# Patient Record
Sex: MALE | Race: WHITE | NOT HISPANIC OR LATINO | Employment: OTHER | URBAN - METROPOLITAN AREA
[De-identification: names, ages, dates, MRNs, and addresses within clinical notes are randomized per-mention and may not be internally consistent; named-entity substitution may affect disease eponyms.]

---

## 2017-10-19 ENCOUNTER — OFFICE VISIT (OUTPATIENT)
Dept: URGENT CARE | Facility: URGENT CARE | Age: 27
End: 2017-10-19
Payer: COMMERCIAL

## 2017-10-19 VITALS
OXYGEN SATURATION: 98 % | SYSTOLIC BLOOD PRESSURE: 149 MMHG | WEIGHT: 200.1 LBS | DIASTOLIC BLOOD PRESSURE: 88 MMHG | TEMPERATURE: 98.5 F | BODY MASS INDEX: 28.71 KG/M2 | HEART RATE: 73 BPM

## 2017-10-19 DIAGNOSIS — R04.0 EPISTAXIS: Primary | ICD-10-CM

## 2017-10-19 PROCEDURE — 99203 OFFICE O/P NEW LOW 30 MIN: CPT | Performed by: FAMILY MEDICINE

## 2017-10-19 NOTE — MR AVS SNAPSHOT
After Visit Summary   10/19/2017    Jian Oliveira    MRN: [id number]           Patient Information     Date Of Birth          1990        Visit Information        Provider Department      10/19/2017 8:50 PM Liot Whitten MD [facility]        Today's Diagnoses     Epistaxis    -  1      Care Instructions    Based on your medical history and these are the current health maintenance or preventive care services that you are due for (some may have been done at this visit)  Health Maintenance Due   Topic Date Due     TETANUS IMMUNIZATION (SYSTEM ASSIGNED)  08/24/2007     INFLUENZA VACCINE (SYSTEM ASSIGNED)  09/01/2017         At [facility], we strive to deliver an exceptional experience to you, every time we see you.    If you receive a survey in the mail, please send us back your thoughts. We really do value your feedback.    Your care team's suggested websites for health information:  Www.Washio : Up to date and easily searchable information on multiple topics.  Www.medlineplus.gov : medication info, interactive tutorials, watch real surgeries online  Www.familydoctor.org : good info from the Academy of Family Physicians  Www.cdc.gov : public health info, travel advisories, epidemics (H1N1)  Www.aap.org : children's health info, normal development, vaccinations  Www.health.[facility].[place].us : [place] dept of health, public health issues in [place], N1N1    How to contact your care team:   Team Sallie/Maximiliano [phone]         Pharmacy [phone]    Dr. Garay, Olya Subramanian PA-C, Kathrine Mckoy CNP, Emilie Ceballos PA-C, Dr. Reid, and ELVIN Dang CNP    Team RN: Karlie      Clinic hours  M-Th 7 am-7 pm   Fri 7 am-5 pm.   Urgent care M-F 11 am-9 pm,   Sat/Sun 9 am-5 pm.  Pharmacy M-Th 8 am-8 pm Fri 8 am-6 pm  Sat/Sun 9 am-5 pm.     All password changes, disabled accounts, or ID changes in MyChart/MyHealth will be done  by our Access Services Department.    If you need help with your account or password, call: [phone]. Clinic staff no longer has the ability to change passwords.     Nosebleed (Adult)    Bleeding from the nose most commonly occurs because of injury or drying and cracking of the inner lining of the nose. Most nosebleeds are because of dry air or nose-picking. They can occur during a common cold or an allergy attack. They can also occur on a very hot day, or from dry air in the winter.  If the bleeding site is found, it may be cauterized. This means it is treated to cause a blood clot to form. This may be done with a chemical, heat, or electricity. If the bleeding continues after the site is cauterized, or if the site cannot be found, packing may be put in your nose. This is to apply pressure and stop the bleeding. The packing may be made of gauze or sponge. A small balloon catheter is sometimes used. These must be removed by your doctor. Some types of packing dissolve on their own.  Home care    If packing was put in your nose, unless told otherwise, do not pull on it or try to remove it yourself. You will be given an appointment to have it removed. You may also have been given antibiotics to prevent a sinus infection. If so, finish all of the medicine.    Do not blow your nose for 12 hours after the bleeding stops. This will allow a strong blood clot to form. Do not pick your nose. This may restart bleeding.    Avoid drinking alcohol and hot liquids for the next 2 days. Alcohol or hot liquids in your mouth can dilate blood vessels in your nose. This can cause bleeding to start again.    Do not take ibuprofen, naproxen, or medicines that contain aspirin. These thin the blood and may cause your nose to bleed. You may take acetaminophen for pain, unless another pain medicine was prescribed.    If the bleeding starts again, sit up and lean forward to prevent swallowing blood. Pinch your nose tightly on both sides,  as shown above, for 10 to 15 minutes. Time yourself. Don t release the pressure on your nose until 10 minutes is up. If bleeding does not stop, continue to pinch your nose and call your healthcare provider or return to this facility.    If you have a cold, allergies, or dry nasal membranes, lubricate the nasal passages. Apply a small amount of petroleum jelly inside the nose with a cotton swab twice a day (morning and night).    Avoid overheating your home. This can dry the air and make your condition worse.    Put a humidifier in the room where you sleep. This will add moisture to the air.    Use a saline nasal spray to keep nasal passages moist.    Do not pick your nose. Keep fingernails trimmed to decrease risk of bleeds.    Do not smoke.  Follow-up care  Follow up with your healthcare provider, or as advised. Nasal packing should be rechecked or removed within 2 to 3 days.  When to seek medical advice  Call your healthcare provider right away if any of these occur.    You have another nosebleed that you cannot control    Dizziness, weakness, or fainting    You become tired or confused    Fever of 100.4 F (38 C) or higher, or as directed by your healthcare provider    Headache    Sinus or facial pain    Shortness of breath or trouble breathing  Date Last Reviewed: 3/22/2015    8714-0399 [employer]. [address] [place] [address]. All rights reserved. This information is not intended as a substitute for professional medical care. Always follow your healthcare professional's instructions.                Follow-ups after your visit        Additional Services     OTOLARYNGOLOGY REFERRAL       Your provider has referred you to: SPIKE: [facility] [phone]   [email or web address]    Please be aware that coverage of these services is subject to the terms and limitations of your health insurance plan.  Call member services at your  "health plan with any benefit or coverage questions.      Please bring the following with you to your appointment:    (1) Any X-Rays, CTs or MRIs which have been performed.  Contact the facility where they were done to arrange for  prior to your scheduled appointment.   (2) List of current medications  (3) This referral request   (4) Any documents/labs given to you for this referral                  Who to contact     If you have questions or need follow up information about today's clinic visit or your schedule please contact [facility] directly at [phone].  Normal or non-critical lab and imaging results will be communicated to you by ApoVaxhart, letter or phone within 4 business days after the clinic has received the results. If you do not hear from us within 7 days, please contact the clinic through ApoVaxhart or phone. If you have a critical or abnormal lab result, we will notify you by phone as soon as possible.  Submit refill requests through One Touch EMR or call your pharmacy and they will forward the refill request to us. Please allow 3 business days for your refill to be completed.          Additional Information About Your Visit        MyChart Information     One Touch EMR lets you send messages to your doctor, view your test results, renew your prescriptions, schedule appointments and more. To sign up, go to [email or web address] . Click on \"Log in\" on the left side of the screen, which will take you to the Welcome page. Then click on \"Sign up Now\" on the right side of the page.     You will be asked to enter the access code listed below, as well as some personal information. Please follow the directions to create your username and password.     Your access code is: [email or web address]  Expires: 2018  9:13 PM     Your access code will  in 90 days. If you need help or a new code, please call your [facility] or [phone].        Care EveryWhere ID     This is your Care EveryWhere " ID. This could be used by other organizations to access your [place] medical records  [id number]        Your Vitals Were     Pulse Temperature Pulse Oximetry BMI (Body Mass Index)          73 98.5  F (36.9  C) (Oral) 98% 28.71 kg/m2         Blood Pressure from Last 3 Encounters:   10/19/17 149/88   11/20/13 138/76    Weight from Last 3 Encounters:   10/19/17 200 lb 1.6 oz (90.8 kg)   11/20/13 175 lb (79.4 kg)              We Performed the Following     OTOLARYNGOLOGY REFERRAL        Primary Care Provider Office Phone # Fax #    Alphonso Sims [phone]       [id number] [place]  [facility] [address]        Equal Access to Services     FLAVIA FRAZIER : Hadii aad ku hadasho Soomaali, waaxda luqadaha, qaybta kaalmada adeegyada, waxay idiin hayaan adriel kheverette cotton . So [facility] [phone].    ATENCIÓN: Si habla español, tiene a villasenor disposición servicios gratuitos de asistencia lingüística. Llame al [phone].    We comply with applicable federal civil rights laws and [place] laws. We do not discriminate on the basis of race, color, national origin, age, disability, sex, sexual orientation, or gender identity.            Thank you!     Thank you for choosing [facility]  for your care. Our goal is always to provide you with excellent care. Hearing back from our patients is one way we can continue to improve our services. Please take a few minutes to complete the written survey that you may receive in the mail after your visit with us. Thank you!             Your Updated Medication List - Protect others around you: Learn how to safely use, store and throw away your medicines at www.disposemymeds.org.      Notice  As of 10/19/2017  9:13 PM    You have not been prescribed any medications.

## 2017-10-20 NOTE — PATIENT INSTRUCTIONS
Based on your medical history and these are the current health maintenance or preventive care services that you are due for (some may have been done at this visit)  Health Maintenance Due   Topic Date Due     TETANUS IMMUNIZATION (SYSTEM ASSIGNED)  08/24/2007     INFLUENZA VACCINE (SYSTEM ASSIGNED)  09/01/2017         At [facility], we strive to deliver an exceptional experience to you, every time we see you.    If you receive a survey in the mail, please send us back your thoughts. We really do value your feedback.    Your care team's suggested websites for health information:  Www.[facility].org : Up to date and easily searchable information on multiple topics.  Www.medlineplus.gov : medication info, interactive tutorials, watch real surgeries online  Www.familydoctor.org : good info from the Academy of Family Physicians  Www.cdc.gov : public health info, travel advisories, epidemics (H1N1)  Www.aap.org : children's health info, normal development, vaccinations  Www.health.[facility].[place].us : [place] dept of health, public health issues in [place], N1N1    How to contact your care team:   Stephani Rizo/Maximiliano [phone]         Pharmacy [phone]    Dr. Garay, Olya Subramanian PA-C, Dr. Patel, Kathrine OCONNOR CNP, Emilie Ceballos PA-C, Dr. Reid, and ELVIN Dang CNP    Team RN: Karlie      Clinic hours  M-Th 7 am-7 pm   Fri 7 am-5 pm.   Urgent care M-F 11 am-9 pm,   Sat/Sun 9 am-5 pm.  Pharmacy M-Th 8 am-8 pm Fri 8 am-6 pm  Sat/Sun 9 am-5 pm.     All password changes, disabled accounts, or ID changes in [email or web address] will be done by our Access Services Department.    If you need help with your account or password, call: [phone]. Clinic staff no longer has the ability to change passwords.     Nosebleed (Adult)    Bleeding from the nose most commonly occurs because of injury or drying and cracking of the inner lining of the nose. Most nosebleeds are because of dry air or  nose-picking. They can occur during a common cold or an allergy attack. They can also occur on a very hot day, or from dry air in the winter.  If the bleeding site is found, it may be cauterized. This means it is treated to cause a blood clot to form. This may be done with a chemical, heat, or electricity. If the bleeding continues after the site is cauterized, or if the site cannot be found, packing may be put in your nose. This is to apply pressure and stop the bleeding. The packing may be made of gauze or sponge. A small balloon catheter is sometimes used. These must be removed by your doctor. Some types of packing dissolve on their own.  Home care    If packing was put in your nose, unless told otherwise, do not pull on it or try to remove it yourself. You will be given an appointment to have it removed. You may also have been given antibiotics to prevent a sinus infection. If so, finish all of the medicine.    Do not blow your nose for 12 hours after the bleeding stops. This will allow a strong blood clot to form. Do not pick your nose. This may restart bleeding.    Avoid drinking alcohol and hot liquids for the next 2 days. Alcohol or hot liquids in your mouth can dilate blood vessels in your nose. This can cause bleeding to start again.    Do not take ibuprofen, naproxen, or medicines that contain aspirin. These thin the blood and may cause your nose to bleed. You may take acetaminophen for pain, unless another pain medicine was prescribed.    If the bleeding starts again, sit up and lean forward to prevent swallowing blood. Pinch your nose tightly on both sides, as shown above, for 10 to 15 minutes. Time yourself. Don t release the pressure on your nose until 10 minutes is up. If bleeding does not stop, continue to pinch your nose and call your healthcare provider or return to this facility.    If you have a cold, allergies, or dry nasal membranes, lubricate the nasal passages. Apply a small amount of  petroleum jelly inside the nose with a cotton swab twice a day (morning and night).    Avoid overheating your home. This can dry the air and make your condition worse.    Put a humidifier in the room where you sleep. This will add moisture to the air.    Use a saline nasal spray to keep nasal passages moist.    Do not pick your nose. Keep fingernails trimmed to decrease risk of bleeds.    Do not smoke.  Follow-up care  Follow up with your healthcare provider, or as advised. Nasal packing should be rechecked or removed within 2 to 3 days.  When to seek medical advice  Call your healthcare provider right away if any of these occur.    You have another nosebleed that you cannot control    Dizziness, weakness, or fainting    You become tired or confused    Fever of 100.4 F (38 C) or higher, or as directed by your healthcare provider    Headache    Sinus or facial pain    Shortness of breath or trouble breathing  Date Last Reviewed: 3/22/2015    4944-1606 [employer]. [address] [place] [address], [place] [address]. All rights reserved. This information is not intended as a substitute for professional medical care. Always follow your healthcare professional's instructions.

## 2017-10-20 NOTE — PROGRESS NOTES
SUBJECTIVE:   Jian Oliveira is a 27 year old male who presents to clinic today for the following health issues:        Headaches      Duration: yesterday    Description  Location: unilateral but varies as to which side   Character: throbbing pain  Frequency:  Comes and goes  Duration:  20 mins    Intensity:  mild    Accompanying signs and symptoms:    Precipitating or Alleviating factors:  Nausea/vomiting: no  Dizziness: very light  Weakness or numbness: no  Visual changes: cloudy vision  Fever: no   Sinus or URI symptoms YES    History  Head trauma: no   Family history of migraines: no   Previous tests for headaches: no   Neurologist evaluations: no   Able to do daily activities when headache present: YES  Wake with headaches: no  Daily pain medication use: no  Any 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  Additional history: as documented    There is no problem list on file for this patient.    No past surgical history on file.    Social History   Substance Use Topics     Smoking status: Never Smoker     Smokeless tobacco: Not on file     Alcohol use Not on file     No family history on file.      No current outpatient prescriptions on file.     No Known Allergies  Recent Labs   Lab Test  11/20/13   1459   CR  0.85   GFRESTIMATED  >90   GFRESTBLACK  >90   POTASSIUM  4.1      BP Readings from Last 3 Encounters:   10/19/17 149/88   11/20/13 138/76    Wt Readings from Last 3 Encounters:   10/19/17 200 lb 1.6 oz (90.8 kg)   11/20/13 175 lb (79.4 kg)                  Labs reviewed in EPIC          Reviewed and updated as needed this visit by clinical staff       Reviewed and updated as needed this visit by  Provider         ROS:  Constitutional, HEENT, cardiovascular, pulmonary, gi and gu systems are negative, except as otherwise noted.      OBJECTIVE:   /88 (BP Location: Right arm, Patient Position: Sitting, Cuff Size: Adult Regular)  Pulse 73  Temp 98.5  F (36.9  C) (Oral)  Wt 200 lb 1.6 oz (90.8 kg)  SpO2 98%  BMI 28.71 kg/m2  Body mass index is 28.71 kg/(m^2).  GENERAL: healthy, alert and no distress  EYES: Eyes grossly normal to inspection, PERRL and conjunctivae and sclerae normal  HENT: normal cephalic/atraumatic, ear canals and TM's normal, oral mucous membranes moist and some dry blood involving right nostril, no active bleeder point/polyps noted  NECK: no adenopathy, no asymmetry, masses, or scars and thyroid normal to palpation  RESP: lungs clear to auscultation - no rales, rhonchi or wheezes  CV: regular rates and rhythm, normal S1 S2, no S3 or S4 and no murmur, click or rub  ABDOMEN: soft, nontender, no hepatosplenomegaly, no masses and bowel sounds normal  MS: no gross musculoskeletal defects noted, no edema  NEURO: Normal strength and tone, mentation intact and speech normal    ASSESSMENT/PLAN:       1. Epistaxis  - Patient experiencing recurrent epistaxis, has experienced epistaxis before, siblings also do have epistaxis history  - Recommended to avoid NSAIDs and blowing nose once bleeding stops  - Suggested to use humidifier and nasal saline for avoiding dryness  - Home care discussed in detail and ENT referral placed for further review and recommendations  - OTOLARYNGOLOGY REFERRAL    Patient Instructions     Based on your medical history and these are the current health maintenance or preventive care services that you are due for (some may have been done at this visit)  Health Maintenance Due   Topic Date Due     TETANUS IMMUNIZATION (SYSTEM ASSIGNED)  08/24/2007     INFLUENZA VACCINE (SYSTEM ASSIGNED)  09/01/2017         At [facility], we strive to deliver an  exceptional experience to you, every time we see you.    If you receive a survey in the mail, please send us back your thoughts. We really do value your feedback.    Your care team's suggested websites for health information:  Www.[facility].org : Up to date and easily searchable information on multiple topics.  Www.medlineplus.gov : medication info, interactive tutorials, watch real surgeries online  Www.familydoctor.org : good info from the Academy of Family Physicians  Www.cdc.gov : public health info, travel advisories, epidemics (H1N1)  Www.aap.org : children's health info, normal development, vaccinations  Www.health.[facility].[place].us : [place] dept of health, public health issues in [place], N1N1    How to contact your care team:   Team Sallie/Spirit [phone]         Pharmacy [phone]    Dr. Garay, Olya Subramanian PA-C, Dr. Patel, Kathrine OCONNOR CNP, Emilie Ceballos PA-C, Dr. Reid, and ELVIN Dang CNP    Team RN: Karlie      Clinic hours  M-Th 7 am-7 pm   Fri 7 am-5 pm.   Urgent care M-F 11 am-9 pm,   Sat/Sun 9 am-5 pm.  Pharmacy M-Th 8 am-8 pm Fri 8 am-6 pm  Sat/Sun 9 am-5 pm.     All password changes, disabled accounts, or ID changes in Leosphere/MyHealth will be done by our Access Services Department.    If you need help with your account or password, call: [phone]. Clinic staff no longer has the ability to change passwords.     Nosebleed (Adult)    Bleeding from the nose most commonly occurs because of injury or drying and cracking of the inner lining of the nose. Most nosebleeds are because of dry air or nose-picking. They can occur during a common cold or an allergy attack. They can also occur on a very hot day, or from dry air in the winter.  If the bleeding site is found, it may be cauterized. This means it is treated to cause a blood clot to form. This may be done with a chemical, heat, or electricity. If the bleeding continues after the site is cauterized, or if the site cannot  be found, packing may be put in your nose. This is to apply pressure and stop the bleeding. The packing may be made of gauze or sponge. A small balloon catheter is sometimes used. These must be removed by your doctor. Some types of packing dissolve on their own.  Home care    If packing was put in your nose, unless told otherwise, do not pull on it or try to remove it yourself. You will be given an appointment to have it removed. You may also have been given antibiotics to prevent a sinus infection. If so, finish all of the medicine.    Do not blow your nose for 12 hours after the bleeding stops. This will allow a strong blood clot to form. Do not pick your nose. This may restart bleeding.    Avoid drinking alcohol and hot liquids for the next 2 days. Alcohol or hot liquids in your mouth can dilate blood vessels in your nose. This can cause bleeding to start again.    Do not take ibuprofen, naproxen, or medicines that contain aspirin. These thin the blood and may cause your nose to bleed. You may take acetaminophen for pain, unless another pain medicine was prescribed.    If the bleeding starts again, sit up and lean forward to prevent swallowing blood. Pinch your nose tightly on both sides, as shown above, for 10 to 15 minutes. Time yourself. Don t release the pressure on your nose until 10 minutes is up. If bleeding does not stop, continue to pinch your nose and call your healthcare provider or return to this facility.    If you have a cold, allergies, or dry nasal membranes, lubricate the nasal passages. Apply a small amount of petroleum jelly inside the nose with a cotton swab twice a day (morning and night).    Avoid overheating your home. This can dry the air and make your condition worse.    Put a humidifier in the room where you sleep. This will add moisture to the air.    Use a saline nasal spray to keep nasal passages moist.    Do not pick your nose. Keep fingernails trimmed to decrease risk of  bleeds.    Do not smoke.  Follow-up care  Follow up with your healthcare provider, or as advised. Nasal packing should be rechecked or removed within 2 to 3 days.  When to seek medical advice  Call your healthcare provider right away if any of these occur.    You have another nosebleed that you cannot control    Dizziness, weakness, or fainting    You become tired or confused    Fever of 100.4 F (38 C) or higher, or as directed by your healthcare provider    Headache    Sinus or facial pain    Shortness of breath or trouble breathing  Date Last Reviewed: 3/22/2015    2689-2760 [employer] [address] [place] [address]. All rights reserved. This information is not intended as a substitute for professional medical care. Always follow your healthcare professional's instructions.            Lito Whitten MD  [facility]

## 2017-10-20 NOTE — NURSING NOTE
"Chief Complaint   Patient presents with     Epistaxis     Headache       Initial /88 (BP Location: Right arm, Patient Position: Sitting, Cuff Size: Adult Regular)  Pulse 73  Temp 98.5  F (36.9  C) (Oral)  Wt 200 lb 1.6 oz (90.8 kg)  SpO2 98%  BMI 28.71 kg/m2 Estimated body mass index is 28.71 kg/(m^2) as calculated from the following:    Height as of 11/20/13: 5' 10\" (1.778 m).    Weight as of this encounter: 200 lb 1.6 oz (90.8 kg).  Medication Reconciliation: complete   Harish GARCIA      "

## 2017-10-25 ENCOUNTER — OFFICE VISIT (OUTPATIENT)
Dept: OTOLARYNGOLOGY | Facility: CLINIC | Age: 27
End: 2017-10-25
Payer: COMMERCIAL

## 2017-10-25 VITALS — TEMPERATURE: 97 F | WEIGHT: 200.8 LBS | BODY MASS INDEX: 28.75 KG/M2 | HEIGHT: 70 IN

## 2017-10-25 DIAGNOSIS — R04.0 EPISTAXIS: Primary | ICD-10-CM

## 2017-10-25 PROCEDURE — 99203 OFFICE O/P NEW LOW 30 MIN: CPT | Performed by: OTOLARYNGOLOGY

## 2017-10-25 NOTE — MR AVS SNAPSHOT
After Visit Summary   10/25/2017    Jian Oliveira    MRN: [id number]           Patient Information     Date Of Birth          1990        Visit Information        Provider Department      10/25/2017 2:45 PM Mark Savage MD [facility]        Today's Diagnoses     Epistaxis    -  1      Care Instructions    General Scheduling Information  To schedule your CT/MRI scan, please contact [place] [facility] at [phone] [address] [place] [address]    To schedule your Surgery, please contact our Specialty Schedulers at [phone]    ENT Clinic Locations Clinic Hours Telephone Number     [facility]  [address]  [place] [address]   Tuesday:       8:00am -- 4:00pm    Wednesday:  8:00am - 4:00pm   To schedule an appointment with   Dr. Savage,   please contact our   Specialty Scheduling Department at:     [phone]       [facility]  [address] [place] [address]   Friday:          8:00am - 4:00pm         Urgent Care Locations Clinic Hours Telephone Numbers     [facility] [place]  [address] [address]     Monday-Friday:     11:00pm - 9:00pm    Saturday-Sunday:  9:00am - 5:00pm   [phone]     [facility] [place]  [address]   [place] [address]     Monday-Friday:      5:00pm - 9:00pm     Saturday-Sunday:  9:00am - 5:00pm   [phone]                 Follow-ups after your visit        Who to contact     If you have questions or need follow up information about today's clinic visit or your schedule please contact [facility] directly at [phone].  Normal or non-critical lab and imaging results will be communicated to you by MyChart, letter or phone within 4 business days after the clinic has received the results. If you do not hear from us within 7 days, please contact the clinic through MyChart or phone. If you have a critical or abnormal lab result, we will notify you by phone  "as soon as possible.  Submit refill requests through Find That File or call your pharmacy and they will forward the refill request to us. Please allow 3 business days for your refill to be completed.          Additional Information About Your Visit        StypiharRingCaptcha Information     Find That File lets you send messages to your doctor, view your test results, renew your prescriptions, schedule appointments and more. To sign up, go to [email or web address] license [email or web address] [email or web address] That [email or web address] . Click on \"Log in\" on the left side of the screen, which will take you to the Welcome page. Then click on \"Sign up Now\" on the right side of the page.     You will be asked to enter the access code listed below, as well as some personal information. Please follow the directions to create your username and password.     Your access code is: [email or web address]  Expires: 2018  9:13 PM     Your access code will  in 90 days. If you need help or a new code, please call your [facility] clinic or [phone].        Care EveryWhere ID     This is your Care EveryWhere ID. This could be used by other organizations to access your [facility] medical records  [id number]        Your Vitals Were     Temperature Height BMI (Body Mass Index)             97  F (36.1  C) (Tympanic) 1.778 m (5' 10\") 28.81 kg/m2          Blood Pressure from Last 3 Encounters:   10/19/17 149/88   13 138/76    Weight from Last 3 Encounters:   10/25/17 91.1 kg (200 lb 12.8 oz)   10/19/17 90.8 kg (200 lb 1.6 oz)   13 79.4 kg (175 lb)              Today, you had the following     No orders found for display       Primary Care Provider Office Phone # Fax #    Alphonso Sims [phone]       [address]  [place] [address]        Equal Access to Services     [facility]: Linda Moffett, waaxda luqadaha, qaybta kaalmaelizabeth bush, elieser cotton . [facility] [phone].    ATENCIÓN: Si ubaldo bahena, tiene a villasenor disposición servicios " haley de asistencia lingüística. Leyda winter [phone].    We comply with applicable federal civil rights laws and [place] laws. We do not discriminate on the basis of race, color, national origin, age, disability, sex, sexual orientation, or gender identity.            Thank you!     Thank you for choosing [facility]  for your care. Our goal is always to provide you with excellent care. Hearing back from our patients is one way we can continue to improve our services. Please take a few minutes to complete the written survey that you may receive in the mail after your visit with us. Thank you!             Your Updated Medication List - Protect others around you: Learn how to safely use, store and throw away your medicines at www.disposemymeds.org.      Notice  As of 10/25/2017  3:42 PM    You have not been prescribed any medications.

## 2017-10-25 NOTE — PROGRESS NOTES
"Chief Complaint - Epistaxis    History of Present Illness - Jian Mcallisterkym Oliveira is a 27 year old male presents with approximately a week of epistaxis. It occurs on the right side. It usually only comes out the front of the nose, but it has ran down the back of the throat at times. The bleeding occurs approximately a few times over a few days, but none in last 3-4 days. The patient can get the bleeding to stop by pressure. The patient has no personal or family history of bleeding disorders. The patient takes no blood-thinning medication. The patient has tried nothing.     Past Medical History - anxiety    Allergies - cephalosporins    Social History -   Social History     Social History     Marital status: Single     Spouse name: N/A     Number of children: N/A     Years of education: N/A     Social History Main Topics     Smoking status: Never Smoker     Smokeless tobacco: None     Alcohol use None     Drug use: None     Sexual activity: Not Asked     Other Topics Concern     None     Social History Narrative   nonsmoker    Family History - see HPI    Review of Systems - As per HPI and PMHx, otherwise 7 system review of the head and neck negative.    Physical Exam  Temp 97  F (36.1  C) (Tympanic)  Ht 1.778 m (5' 10\")  Wt 91.1 kg (200 lb 12.8 oz)  BMI 28.81 kg/m2  General - The patient is in no distress.  Alert and oriented x3, answers questions and cooperates with examination appropriately.   Voice and Breathing - The patient was breathing comfortably without the use of accessory muscles. There was no wheezing, stridor, or stertor.  The patients voice was clear and strong, with no dysphonia.  Head and Face - Normocephalic and atraumatic.    Eyes - Extraocular movements intact. Sclera were not icteric or injected, conjunctiva were pink and moist.  Neurologic - Cranial nerves II-XII are grossly intact. Specifically, the facial nerve is intact, House-Brackmann grade 1 of 6.   Nose - No significant external " deformity. The nasal mucosa along the anterior septum on the R side shows one prominent blood vessel superficially located. Also shows some dirt debri. The left side was mostly normal.  The septum was midline, turbinates are of normal size and position.  No polyps, masses, or purulence.  Mouth - Examination of the oral cavity showed pink, healthy oral mucosa. No lesions or ulcerations noted.  The tongue was mobile and protrudes midline.   Oropharynx - The walls of the oropharynx were smooth, symmetric, and had no lesions or ulcerations. The uvula was midline and the palate raised symmetrically.   Neck -  Palpation of the occipital, submental, submandibular, internal jugular chain, and supraclavicular nodes did not demonstrate any abnormal lymph nodes or masses. The parotid glands were without masses. Palpation of the thyroid was soft and smooth, with no nodules or goiter appreciated.  The trachea was midline.    A/P - Jian Tolu Olievira is a 27 year old male with epistaxis. This is almost certainly coming from the right anterior septum. He deferred cautery today. We discussed restrictions on manipulation and picking of the nose. I explained using vaseline twice daily to the anterior septum, or nasal saline spray 3-5 times per day, and a humidifier at the bedside at night.    I also explained applying digital pressure to the nasal tip for a minimum of 15-20 minutes to stop a nose bleed. If that doesn't stop the bleeding the patient needs to proceed to the nearest emergency department. I will see the patient back as needed.     Mark Savage MD  Otolaryngology  [facility]

## 2017-10-25 NOTE — LETTER
"    10/25/2017         RE: Jian Oliveira  [address]   [place] [facility] [address]        Dear Colleague,    Thank you for referring your patient, Jian Oliveira, to the [facility]. Please see a copy of my visit note below.    Chief Complaint - Epistaxis    History of Present Illness - Jian Olvieira is a 27 year old male presents with approximately a week of epistaxis. It occurs on the right side. It usually only comes out the front of the nose, but it has ran down the back of the throat at times. The bleeding occurs approximately a few times over a few days, but none in last 3-4 days. The patient can get the bleeding to stop by pressure. The patient has no personal or family history of bleeding disorders. The patient takes no blood-thinning medication. The patient has tried nothing.     Past Medical History - anxiety    Allergies - cephalosporins    Social History -   Social History     Social History     Marital status: Single     Spouse name: N/A     Number of children: N/A     Years of education: N/A     Social History Main Topics     Smoking status: Never Smoker     Smokeless tobacco: None     Alcohol use None     Drug use: None     Sexual activity: Not Asked     Other Topics Concern     None     Social History Narrative   nonsmoker    Family History - see HPI    Review of Systems - As per HPI and PMHx, otherwise 7 system review of the head and neck negative.    Physical Exam  Temp 97  F (36.1  C) (Tympanic)  Ht 1.778 m (5' 10\")  Wt 91.1 kg (200 lb 12.8 oz)  BMI 28.81 kg/m2  General - The patient is in no distress.  Alert and oriented x3, answers questions and cooperates with examination appropriately.   Voice and Breathing - The patient was breathing comfortably without the use of accessory muscles. There was no wheezing, stridor, or stertor.  The patients voice was clear and strong, with no dysphonia.  Head and Face - Normocephalic and atraumatic.    Eyes - Extraocular " movements intact. Sclera were not icteric or injected, conjunctiva were pink and moist.  Neurologic - Cranial nerves II-XII are grossly intact. Specifically, the facial nerve is intact, House-Brackmann grade 1 of 6.   Nose - No significant external deformity. The nasal mucosa along the anterior septum on the R side shows one prominent blood vessel superficially located. Also shows some dirt debri. The left side was mostly normal.  The septum was midline, turbinates are of normal size and position.  No polyps, masses, or purulence.  Mouth - Examination of the oral cavity showed pink, healthy oral mucosa. No lesions or ulcerations noted.  The tongue was mobile and protrudes midline.   Oropharynx - The walls of the oropharynx were smooth, symmetric, and had no lesions or ulcerations. The uvula was midline and the palate raised symmetrically.   Neck -  Palpation of the occipital, submental, submandibular, internal jugular chain, and supraclavicular nodes did not demonstrate any abnormal lymph nodes or masses. The parotid glands were without masses. Palpation of the thyroid was soft and smooth, with no nodules or goiter appreciated.  The trachea was midline.    A/P - Jian Tolu Oliveira is a 27 year old male with epistaxis. This is almost certainly coming from the right anterior septum. He deferred cautery today. We discussed restrictions on manipulation and picking of the nose. I explained using vaseline twice daily to the anterior septum, or nasal saline spray 3-5 times per day, and a humidifier at the bedside at night.    I also explained applying digital pressure to the nasal tip for a minimum of 15-20 minutes to stop a nose bleed. If that doesn't stop the bleeding the patient needs to proceed to the nearest emergency department. I will see the patient back as needed.     Mark Savage MD  Otolaryngology  [facility]      Again, thank you for allowing me to participate in the care of your patient.         Sincerely,        Mark Savage MD

## 2017-10-25 NOTE — PATIENT INSTRUCTIONS
General Scheduling Information  To schedule your CT/MRI scan, please contact [place] [facility] at [phone]   [address] [place] [address]    To schedule your Surgery, please contact our Specialty Schedulers at [phone]    ENT Clinic Locations Clinic Hours Telephone Number     [facility] [place]  [address] [place] [address]   Tuesday:       8:00am -- 4:00pm    Wednesday:  8:00am - 4:00pm   To schedule an appointment with   Dr. Savage,   please contact our   Specialty Scheduling Department at:     [phone]       [facility]  [address] [place] [address]   Friday:          8:00am - 4:00pm         Urgent Care Locations Clinic Hours Telephone Numbers     [facility]  [address] [place] [address]     Monday-Friday:     11:00pm - 9:00pm    Saturday-Sunday:  9:00am - 5:00pm   [phone]     [facility]  [address] [place] [address]     Monday-Friday:      5:00pm - 9:00pm     Saturday-Sunday:  9:00am - 5:00pm   [phone]

## 2017-10-25 NOTE — NURSING NOTE
"Chief Complaint   Patient presents with     Nose Problem     Nosebleed       Initial Temp 97  F (36.1  C) (Tympanic)  Ht 1.778 m (5' 10\")  Wt 91.1 kg (200 lb 12.8 oz)  BMI 28.81 kg/m2 Estimated body mass index is 28.81 kg/(m^2) as calculated from the following:    Height as of this encounter: 1.778 m (5' 10\").    Weight as of this encounter: 91.1 kg (200 lb 12.8 oz).  Medication Reconciliation: complete       Luís Macias MA    "

## 2019-04-27 ENCOUNTER — OFFICE VISIT (OUTPATIENT)
Dept: URGENT CARE | Facility: URGENT CARE | Age: 29
End: 2019-04-27

## 2019-04-27 VITALS
SYSTOLIC BLOOD PRESSURE: 153 MMHG | BODY MASS INDEX: 29.51 KG/M2 | HEART RATE: 71 BPM | RESPIRATION RATE: 18 BRPM | WEIGHT: 205.7 LBS | DIASTOLIC BLOOD PRESSURE: 82 MMHG | OXYGEN SATURATION: 100 % | TEMPERATURE: 98.3 F

## 2019-04-27 DIAGNOSIS — R07.0 THROAT PAIN: ICD-10-CM

## 2019-04-27 DIAGNOSIS — R09.A2 GLOBUS SENSATION: Primary | ICD-10-CM

## 2019-04-27 LAB
DEPRECATED S PYO AG THROAT QL EIA: NORMAL
SPECIMEN SOURCE: NORMAL

## 2019-04-27 PROCEDURE — 87880 STREP A ASSAY W/OPTIC: CPT | Performed by: PHYSICIAN ASSISTANT

## 2019-04-27 PROCEDURE — 99213 OFFICE O/P EST LOW 20 MIN: CPT | Performed by: PHYSICIAN ASSISTANT

## 2019-04-27 PROCEDURE — 87081 CULTURE SCREEN ONLY: CPT | Performed by: PHYSICIAN ASSISTANT

## 2019-04-27 SDOH — HEALTH STABILITY: MENTAL HEALTH: HOW OFTEN DO YOU HAVE A DRINK CONTAINING ALCOHOL?: NEVER

## 2019-04-27 ASSESSMENT — PAIN SCALES - GENERAL: PAINLEVEL: NO PAIN (0)

## 2019-04-27 NOTE — LETTER
4/28/2019       Jian Oliveira  [address]   [facility] [address]        Dear Jian Motley:    Thank you for allowing me to participate in your care. Your recent test results were reviewed and listed below.      Your results are normal- negative throat culture.     Thank you for choosing [facility]. As a result, please continue with the treatment plan discussed in the office. Return as discussed or sooner if symptoms worsens or fail to improve. If you have any further questions or concerns, please do not hesitate to contact us.      Sincerely,    Amy Marie PA-C     [facility]  [address]  [facility] [address]  Phone: [phone]

## 2019-04-27 NOTE — PROGRESS NOTES
S: 28-year-old male presents for evaluation of intermittent random throat discomfort that he describes as a constriction, swelling or globus sensation for 1 to 1-1/2 weeks.  When it occurs it lasts a few seconds.  He does states that he is under quite a bit of stress lately.  He thinks his mom may have a thyroid problem but is not sure.  He denies any seasonal allergies.  No cough, postnasal drip, nasal congestion, sneezing or watery itchy eyes.  No fever.  No acid taste in his throat.       Allergies   Allergen Reactions     Ceclor [Cefaclor]      Septra [Sulfamethoxazole W/Trimethoprim]        Past Medical History:   Diagnosis Date     Hypospadias     repair around the time of birth.          No current outpatient medications on file prior to visit.  No current facility-administered medications on file prior to visit.     Social History     Tobacco Use     Smoking status: Never Smoker     Smokeless tobacco: Never Used   Substance Use Topics     Alcohol use: Never     Frequency: Never       ROS:  CONSTITUTIONAL: Negative for fatigue or fever.  EYES: Negative for eye problems.  ENT: As above.  RESP: As above.  CV: Negative for chest pains.  GI: Negative for vomiting.  MUSCULOSKELETAL:  Negative for significant muscle or joint pains.  NEUROLOGIC: Negative for headaches.  SKIN: Negative for rash.    OBJECTIVE:  /82 (BP Location: Right arm, Patient Position: Sitting, Cuff Size: Adult Large)   Pulse 71   Temp 98.3  F (36.8  C) (Oral)   Resp 18   Wt 93.3 kg (205 lb 11.2 oz)   SpO2 100%   BMI 29.51 kg/m    GENERAL APPEARANCE: Healthy, alert and no distress.  EYES:Conjunctiva/sclera clear.  EARS: No cerumen.   Ear canals w/o erythema.  TM's intact w/o erythema.    NOSE/MOUTH: Nose without ulcers, erythema or lesions.  SINUSES: No maxillary sinus tenderness.  THROAT: No erythema w/o tonsillar enlargement . No exudates.  NECK: Supple, nontender, no lymphadenopathy.  No thyroid megaly.  RESP: Lungs clear to  auscultation - no rales, rhonchi or wheezes  CV: Regular rate and rhythm, normal S1 S2, no murmur noted.  NEURO: Awake, alert    SKIN: No rashes    Results for orders placed or performed in visit on 04/27/19   Strep, Rapid Screen   Result Value Ref Range    Specimen Description Throat     Rapid Strep A Screen       NEGATIVE: No Group A streptococcal antigen detected by immunoassay, await culture report.         ASSESSMENT:     ICD-10-CM    1. Globus sensation F45.8 TSH with free T4 reflex     CANCELED: TSH with free T4 reflex     CANCELED: CBC with platelets differential   2. Throat pain R07.0 Strep, Rapid Screen     Beta strep group A culture     CANCELED: TSH with free T4 reflex     CANCELED: CBC with platelets differential         PLAN: Likely due to anxiety.  Observe over the next couple weeks.  Offered to do thyroid test today but he would like to verify with his mom that she has a thyroid issue.  He states money is an issue.  Could try antihistamine in case there is some sort of allergy component.  RTC if any worsening symptoms or if not improving.    Amy Marie PA-C

## 2019-04-28 LAB
BACTERIA SPEC CULT: NORMAL
SPECIMEN SOURCE: NORMAL

## 2019-05-03 ENCOUNTER — TELEPHONE (OUTPATIENT)
Dept: FAMILY MEDICINE | Facility: CLINIC | Age: 29
End: 2019-05-03

## 2019-05-03 NOTE — TELEPHONE ENCOUNTER
.Reason for call:  Patient reporting a symptom    Symptom or request: hit his head(working out)    Duration (how long have symptoms been present): yesterday    Have you been treated for this before? No    Additional comments: headache, no nausea, vomiting, but experienced clear water form one nostril - two episodes... Headache is worse    Phone Number patient can be reached at:  Home number on file [phone] (home)    Best Time:  Transferred to RN    Can we leave a detailed message on this number:  n/a    Call taken on 5/3/2019 at 3:18 PM by Beatriz Garvin

## 2019-05-03 NOTE — TELEPHONE ENCOUNTER
"Jian Oliveira is a 28 year old male who calls with a head injury from yesterday.    NURSING ASSESSMENT:  Description:  Patient was working out yesterday evening and did not realize how close to the ceiling he was and hit the top of his head very hard.   Onset/duration:  24 hours  Precip. 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  Associated symptoms:  Headache since this occurred. Last night his pupils were unequal, but today they are equal and reactive to light. Patient is A&0 x 3. Patient also suddenly had clear, serous drainage \"shoot\" out of his nostril two different times.   Improves/worsens symptoms:  Nothing  Pain scale (0-10)   3/10  LMP/preg/breast feeding:  N/A  Last exam/Treatment:  4/27/19 -   Allergies:   Allergies   Allergen Reactions     Ceclor [Cefaclor]      Septra [Sulfamethoxazole W/Trimethoprim]        MEDICATIONS:   Taking medication(s) as prescribed? Yes  Taking over the counter medication(s?) Yes  Any medication side effects? No significant side effects    Any barriers to taking medication(s) as prescribed?  No  Medication(s) improving/managing symptoms?  No  Medication reconciliation completed: Yes      NURSING PLAN: Nursing advice to patient is to be evaluated in the ED now.    RECOMMENDED DISPOSITION:  To ED, another person to drive - Due to patient having constant headache after hitting head very hard, also due to abnormal pupil reaction within the last 24 hours, and then due to a clear/serous/unknown very liquid-like fluid draining from patient's nostrils twice.  Will comply with recommendation: Yes  If further questions/concerns or if symptoms do not improve, worsen or new symptoms develop, call your PCP or [facility] as soon as possible.      Guideline used:  Telephone Triage Protocols for Nurses, Fifth Edition, Raven FULLERN, RN    "

## 2019-08-22 ENCOUNTER — OFFICE VISIT (OUTPATIENT)
Dept: URGENT CARE | Facility: URGENT CARE | Age: 29
End: 2019-08-22

## 2019-08-22 VITALS
OXYGEN SATURATION: 99 % | SYSTOLIC BLOOD PRESSURE: 167 MMHG | DIASTOLIC BLOOD PRESSURE: 84 MMHG | TEMPERATURE: 98.5 F | HEART RATE: 66 BPM | BODY MASS INDEX: 27.81 KG/M2 | WEIGHT: 193.8 LBS

## 2019-08-22 DIAGNOSIS — S80.812A ABRASION OF LEFT CALF, INITIAL ENCOUNTER: ICD-10-CM

## 2019-08-22 DIAGNOSIS — S80.12XA CONTUSION OF LEFT CALF, INITIAL ENCOUNTER: Primary | ICD-10-CM

## 2019-08-22 PROBLEM — Z82.49 FAMILY HISTORY OF EARLY CAD: Status: ACTIVE | Noted: 2017-08-02

## 2019-08-22 PROCEDURE — 99213 OFFICE O/P EST LOW 20 MIN: CPT | Performed by: PHYSICIAN ASSISTANT

## 2019-08-22 ASSESSMENT — ENCOUNTER SYMPTOMS
RESPIRATORY NEGATIVE: 1
EYE REDNESS: 0
DIZZINESS: 0
COUGH: 0
WOUND: 1
EYES NEGATIVE: 1
EYE DISCHARGE: 0
SORE THROAT: 0
ADENOPATHY: 0
MYALGIAS: 0
CARDIOVASCULAR NEGATIVE: 1
DYSURIA: 0
ENDOCRINE NEGATIVE: 1
CONSTITUTIONAL NEGATIVE: 1
DIAPHORESIS: 0
WHEEZING: 0
POLYDIPSIA: 0
CHEST TIGHTNESS: 0
FEVER: 0
LIGHT-HEADEDNESS: 0
DIARRHEA: 0
HEMATURIA: 0
SHORTNESS OF BREATH: 0
MUSCULOSKELETAL NEGATIVE: 1
VOMITING: 0
RHINORRHEA: 0
NAUSEA: 0
ABDOMINAL PAIN: 0
CHILLS: 0
PALPITATIONS: 0
HEADACHES: 0
WEAKNESS: 0
NEUROLOGICAL NEGATIVE: 1
GASTROINTESTINAL NEGATIVE: 1
EYE ITCHING: 0
FREQUENCY: 0

## 2019-08-23 NOTE — PROGRESS NOTES
Chief Complaint:    Chief Complaint   Patient presents with     Trauma     Patient complains of bug bite on back of left leg        HPI: Jian Oliveira is an 28 year old male who presents for evaluation and treatment of possible insect bite.  Patient noticed some bruising on the L calf roughly 1 weeks ago.  The area is not painful.  His symptoms have been improving.        ROS:      Review of Systems   Constitutional: Negative.  Negative for chills, diaphoresis and fever.   HENT: Negative.  Negative for congestion, ear pain, rhinorrhea and sore throat.    Eyes: Negative.  Negative for discharge, redness and itching.   Respiratory: Negative.  Negative for cough, chest tightness, shortness of breath and wheezing.    Cardiovascular: Negative.  Negative for chest pain and palpitations.   Gastrointestinal: Negative.  Negative for abdominal pain, diarrhea, nausea and vomiting.   Endocrine: Negative.  Negative for polydipsia and polyuria.   Genitourinary: Negative for dysuria, frequency, hematuria and urgency.   Musculoskeletal: Negative.  Negative for myalgias.   Skin: Positive for wound. Negative for rash.   Allergic/Immunologic: Negative for immunocompromised state.   Neurological: Negative.  Negative for dizziness, weakness, light-headedness and headaches.   Hematological: Negative for adenopathy.        Family History   No family history on file.    Social History  Social History     Socioeconomic History     Marital status: Single     Spouse name: Not on file     Number of children: Not on file     Years of education: Not on file     Highest education level: Not on file   Occupational History     Not on file   Social Needs     Financial resource strain: Not on file     Food insecurity:     Worry: Not on file     Inability: Not on file     Transportation needs:     Medical: Not on file     Non-medical: Not on file   Tobacco Use     Smoking status: Never Smoker     Smokeless tobacco: Never Used   Substance and  Sexual Activity     Alcohol use: Never     Frequency: Never     Drug use: Never     Sexual activity: Not on file   Lifestyle     Physical activity:     Days per week: Not on file     Minutes per session: Not on file     Stress: Not on file   Relationships     Social connections:     Talks on phone: Not on file     Gets together: Not on file     Attends Mu-ism service: Not on file     Active member of club or organization: Not on file     Attends meetings of clubs or organizations: Not on file     Relationship status: Not on file     Intimate partner violence:     Fear of current or ex partner: Not on file     Emotionally abused: Not on file     Physically abused: Not on file     Forced sexual activity: Not on file   Other Topics Concern     Not on file   Social History Narrative     Not on file        Surgical History:  No past surgical history on file.     Problem List:  Patient Active Problem List   Diagnosis     Family history of early CAD        Allergies:  Allergies   Allergen Reactions     Septra [Sulfamethoxazole W/Trimethoprim]      Sulfamethoxazole-Trimethoprim Unknown     Cefaclor Unknown and Rash     Sulfa Drugs Rash        Current Meds:  No current outpatient medications on file.     PHYSICAL EXAM:     Vital signs noted and reviewed by Brian Rayo PA-C  BP (!) 167/84 (BP Location: Left arm, Patient Position: Chair, Cuff Size: Adult Regular)   Pulse 66   Temp 98.5  F (36.9  C) (Oral)   Wt 87.9 kg (193 lb 12.8 oz)   SpO2 99%   BMI 27.81 kg/m       PEFR:    Physical Exam   Constitutional: He appears well-developed and well-nourished. He is cooperative.  Non-toxic appearance. He does not have a sickly appearance. He does not appear ill. No distress.   HENT:   Head: Normocephalic and atraumatic.   Right Ear: Hearing, tympanic membrane, external ear and ear canal normal. Tympanic membrane is not perforated, not erythematous, not retracted and not bulging.   Left Ear: Hearing, tympanic membrane,  external ear and ear canal normal. Tympanic membrane is not perforated, not erythematous, not retracted and not bulging.   Nose: Nose normal. No mucosal edema or rhinorrhea.   Mouth/Throat: Oropharynx is clear and moist and mucous membranes are normal. No oropharyngeal exudate, posterior oropharyngeal edema, posterior oropharyngeal erythema or tonsillar abscesses. Tonsils are 0 on the right. Tonsils are 0 on the left. No tonsillar exudate.   Eyes: Pupils are equal, round, and reactive to light. EOM are normal.   Neck: Normal range of motion. Neck supple.   Cardiovascular: Normal rate, regular rhythm, S1 normal, S2 normal, normal heart sounds and intact distal pulses. Exam reveals no gallop, no distant heart sounds and no friction rub.   No murmur heard.  Pulmonary/Chest: Effort normal and breath sounds normal. No respiratory distress. He has no decreased breath sounds. He has no wheezes. He has no rhonchi. He has no rales.   Abdominal: Soft. Bowel sounds are normal. He exhibits no distension. There is no tenderness.   Musculoskeletal:        Legs:  Roughly 4 cm in diameter contusion of the L calf.  Several excoriations and abrasions in this area,  No erythema, or swelling.   Lymphadenopathy:     He has no cervical adenopathy.   Neurological: He is alert. No cranial nerve deficit.   Skin: Skin is warm and dry. No rash noted. He is not diaphoretic.   Psychiatric: He has a normal mood and affect. His speech is normal and behavior is normal. Judgment and thought content normal. Cognition and memory are normal. He is attentive.   Nursing note and vitals reviewed.       Medical Decision Making:    Differential Diagnosis:  Contusion of calf, abrasion of calf.     ASSESSMENT:     1. Contusion of left calf, initial encounter    2. Abrasion of left calf, initial encounter           PLAN:     No indication of infection.  Area is not painful to touch and symptoms have been improving.  Patient will continue to monitor  this.  Patient instructed to follow up with PCP in 1 week if symptoms are not improving.  Sooner if symptoms worsen.  Worrisome symptoms discussed with instructions to go to the ED.  Patient verbalized understanding and agreed with this plan.     Brian Rayo PA-C  8/22/2019, 8:46 PM

## 2021-01-15 ENCOUNTER — OFFICE VISIT (OUTPATIENT)
Dept: FAMILY MEDICINE | Facility: CLINIC | Age: 31
End: 2021-01-15

## 2021-01-15 VITALS
HEART RATE: 83 BPM | OXYGEN SATURATION: 97 % | SYSTOLIC BLOOD PRESSURE: 147 MMHG | RESPIRATION RATE: 18 BRPM | WEIGHT: 194 LBS | BODY MASS INDEX: 27.16 KG/M2 | TEMPERATURE: 97 F | HEIGHT: 71 IN | DIASTOLIC BLOOD PRESSURE: 88 MMHG

## 2021-01-15 DIAGNOSIS — R03.0 ELEVATED BLOOD PRESSURE READING WITHOUT DIAGNOSIS OF HYPERTENSION: ICD-10-CM

## 2021-01-15 DIAGNOSIS — H93.8X1 EAR CONGESTION, RIGHT: Primary | ICD-10-CM

## 2021-01-15 PROCEDURE — 99203 OFFICE O/P NEW LOW 30 MIN: CPT | Performed by: INTERNAL MEDICINE

## 2021-01-15 RX ORDER — PSEUDOEPHEDRINE HCL 60 MG
60 TABLET ORAL EVERY 6 HOURS PRN
Qty: 30 TABLET | Refills: 0 | Status: SHIPPED | OUTPATIENT
Start: 2021-01-15 | End: 2022-02-27

## 2021-01-15 ASSESSMENT — MIFFLIN-ST. JEOR: SCORE: 1857.11

## 2021-01-15 NOTE — PROGRESS NOTES
"  Assessment & Plan     Ear congestion, right  Most probably from eustachian tube dysfunction from blocked tube  No evidence of active infection appreciated  We will try some Sudafed  Hold off on antibiotics for now  Review in a week if no improvement  - pseudoePHEDrine (SUDAFED) 60 MG tablet; Take 1 tablet (60 mg) by mouth every 6 hours as needed for congestion    Elevated blood pressure reading without diagnosis of hypertension  He considers himself very stressed currently  Also takes a lot of caffeine  Discussed about cutting down caffeine and salt and he used to monitor his blood pressure at home                15 minutes spent on the date of the encounter doing chart review, patient visit and documentation          BMI:   Estimated body mass index is 27.06 kg/m  as calculated from the following:    Height as of this encounter: 1.803 m (5' 11\").    Weight as of this encounter: 88 kg (194 lb).             Return if symptoms worsen or fail to improve.    Narayan Hsieh MD  [facility]    Lizzie Motley is a 30 year old who presents to clinic today for the following health issues     HPI       Concern - R ear pain  Onset: 2-3 days  Description: Pt was talking and heard a pop. Feels uncomfortable- especially with swollowing. No report of dizziness or feeling lightheaded.              Review of Systems   Constitutional, HEENT, cardiovascular, pulmonary, gi and gu systems are negative, except as otherwise noted.      Objective    BP (!) 147/88   Pulse 83   Temp 97  F (36.1  C) (Tympanic)   Resp 18   Ht 1.803 m (5' 11\")   Wt 88 kg (194 lb)   SpO2 97%   BMI 27.06 kg/m    Body mass index is 27.06 kg/m .  Physical Exam   GENERAL: healthy, alert and no distress  EYES: Eyes grossly normal to inspection, PERRL and conjunctivae and sclerae normal  HENT: Wax in both ears  Tympanic membrane is normal  NECK: no adenopathy, no asymmetry, masses, or scars and thyroid normal to " palpation  RESP: lungs clear to auscultation - no rales, rhonchi or wheezes  CV: regular rate and rhythm, normal S1 S2, no S3 or S4, no murmur, click or rub, no peripheral edema and peripheral pulses strong  ABDOMEN: soft, nontender, no hepatosplenomegaly, no masses and bowel sounds normal  MS: no gross musculoskeletal defects noted, no edema  SKIN: no suspicious lesions or rashes  NEURO: Normal strength and tone, mentation intact and speech normal  PSYCH: mentation appears normal, affect normal/bright

## 2021-01-15 NOTE — PATIENT INSTRUCTIONS
Patient Education     Anatomy of the Ear     The ear is a complex and delicate organ. It collects sound waves so you can hear the world around you. The ear also has a second function--it helps you keep your balance.  Your ear can be divided into 3 main parts. These are the outer ear, the middle ear, and the inner ear. The outer ear and middle ear help collect and amplify sound. The inner ear converts sound waves to messages that are sent to the brain. The inner ear also senses the movement and position of your head and body. It helps you maintain your balance and see clearly, even when you change positions.  Here is how the different parts of your ear work together to help you hear and stay balanced:    The mastoid bone surrounds the middle ear.    The external ear collects sound waves.    The ear canal carries those sound waves to the eardrum.    The eardrum vibrates from the sound waves, setting the middle ear bones in motion.    The middle ear bones (ossicles) vibrate, transmitting the sound waves to the inner ear. When the ear is healthy, air pressure remains balanced in the middle ear.    The eustachian tube helps control air pressure in the middle ear.     The semicircular canals help maintain balance.    The vestibular nerve carries balance signals to the brain.    The cochlea picks up the sound waves and makes nerve signals.     The auditory nerve carries the sound signals to the brain.    [employer] last reviewed this educational content on 12/1/2019 2000-2020 [employer]. [address] [place] [address], [place] [address]. All rights reserved. This information is not intended as a substitute for professional medical care. Always follow your healthcare professional's instructions.

## 2022-02-27 ENCOUNTER — OFFICE VISIT (OUTPATIENT)
Dept: URGENT CARE | Facility: URGENT CARE | Age: 32
End: 2022-02-27
Payer: COMMERCIAL

## 2022-02-27 ENCOUNTER — ANCILLARY PROCEDURE (OUTPATIENT)
Dept: GENERAL RADIOLOGY | Facility: CLINIC | Age: 32
End: 2022-02-27
Attending: STUDENT IN AN ORGANIZED HEALTH CARE EDUCATION/TRAINING PROGRAM
Payer: COMMERCIAL

## 2022-02-27 VITALS
BODY MASS INDEX: 28.54 KG/M2 | OXYGEN SATURATION: 100 % | DIASTOLIC BLOOD PRESSURE: 104 MMHG | HEART RATE: 102 BPM | TEMPERATURE: 98.5 F | SYSTOLIC BLOOD PRESSURE: 150 MMHG | WEIGHT: 204.6 LBS

## 2022-02-27 DIAGNOSIS — R03.0 ELEVATED BP WITHOUT DIAGNOSIS OF HYPERTENSION: ICD-10-CM

## 2022-02-27 DIAGNOSIS — R61 NIGHT SWEATS: Primary | ICD-10-CM

## 2022-02-27 DIAGNOSIS — R50.9 FEVER, UNSPECIFIED FEVER CAUSE: ICD-10-CM

## 2022-02-27 LAB
ALBUMIN SERPL-MCNC: 4.4 G/DL (ref 3.4–5)
ALP SERPL-CCNC: 84 U/L (ref 40–150)
ALT SERPL W P-5'-P-CCNC: 117 U/L (ref 0–70)
ANION GAP SERPL CALCULATED.3IONS-SCNC: 6 MMOL/L (ref 3–14)
AST SERPL W P-5'-P-CCNC: 51 U/L (ref 0–45)
BILIRUB SERPL-MCNC: 0.8 MG/DL (ref 0.2–1.3)
BUN SERPL-MCNC: 14 MG/DL (ref 7–30)
CALCIUM SERPL-MCNC: 9.2 MG/DL (ref 8.5–10.1)
CHLORIDE BLD-SCNC: 103 MMOL/L (ref 94–109)
CO2 SERPL-SCNC: 28 MMOL/L (ref 20–32)
CREAT SERPL-MCNC: 0.98 MG/DL (ref 0.66–1.25)
DEPRECATED S PYO AG THROAT QL EIA: NEGATIVE
ERYTHROCYTE [DISTWIDTH] IN BLOOD BY AUTOMATED COUNT: 11.9 % (ref 10–15)
ERYTHROCYTE [SEDIMENTATION RATE] IN BLOOD BY WESTERGREN METHOD: 6 MM/HR (ref 0–15)
FLUAV AG SPEC QL IA: NEGATIVE
FLUBV AG SPEC QL IA: NEGATIVE
GFR SERPL CREATININE-BSD FRML MDRD: >90 ML/MIN/1.73M2
GLUCOSE BLD-MCNC: 103 MG/DL (ref 70–99)
GROUP A STREP BY PCR: NOT DETECTED
HCT VFR BLD AUTO: 42.9 % (ref 40–53)
HGB BLD-MCNC: 15.1 G/DL (ref 13.3–17.7)
MCH RBC QN AUTO: 30.2 PG (ref 26.5–33)
MCHC RBC AUTO-ENTMCNC: 35.2 G/DL (ref 31.5–36.5)
MCV RBC AUTO: 86 FL (ref 78–100)
MONOCYTES NFR BLD AUTO: NEGATIVE %
PLATELET # BLD AUTO: 260 10E3/UL (ref 150–450)
POTASSIUM BLD-SCNC: 4.2 MMOL/L (ref 3.4–5.3)
PROT SERPL-MCNC: 7.7 G/DL (ref 6.8–8.8)
RBC # BLD AUTO: 5 10E6/UL (ref 4.4–5.9)
SODIUM SERPL-SCNC: 137 MMOL/L (ref 133–144)
TSH SERPL DL<=0.005 MIU/L-ACNC: 0.7 MU/L (ref 0.4–4)
WBC # BLD AUTO: 8.6 10E3/UL (ref 4–11)

## 2022-02-27 PROCEDURE — 84443 ASSAY THYROID STIM HORMONE: CPT | Performed by: STUDENT IN AN ORGANIZED HEALTH CARE EDUCATION/TRAINING PROGRAM

## 2022-02-27 PROCEDURE — 85027 COMPLETE CBC AUTOMATED: CPT | Performed by: STUDENT IN AN ORGANIZED HEALTH CARE EDUCATION/TRAINING PROGRAM

## 2022-02-27 PROCEDURE — 71046 X-RAY EXAM CHEST 2 VIEWS: CPT | Performed by: RADIOLOGY

## 2022-02-27 PROCEDURE — 85652 RBC SED RATE AUTOMATED: CPT | Performed by: STUDENT IN AN ORGANIZED HEALTH CARE EDUCATION/TRAINING PROGRAM

## 2022-02-27 PROCEDURE — 87651 STREP A DNA AMP PROBE: CPT | Performed by: STUDENT IN AN ORGANIZED HEALTH CARE EDUCATION/TRAINING PROGRAM

## 2022-02-27 PROCEDURE — 80053 COMPREHEN METABOLIC PANEL: CPT | Performed by: STUDENT IN AN ORGANIZED HEALTH CARE EDUCATION/TRAINING PROGRAM

## 2022-02-27 PROCEDURE — 93005 ELECTROCARDIOGRAM TRACING: CPT | Performed by: STUDENT IN AN ORGANIZED HEALTH CARE EDUCATION/TRAINING PROGRAM

## 2022-02-27 PROCEDURE — 87804 INFLUENZA ASSAY W/OPTIC: CPT | Performed by: STUDENT IN AN ORGANIZED HEALTH CARE EDUCATION/TRAINING PROGRAM

## 2022-02-27 PROCEDURE — 99215 OFFICE O/P EST HI 40 MIN: CPT | Performed by: STUDENT IN AN ORGANIZED HEALTH CARE EDUCATION/TRAINING PROGRAM

## 2022-02-27 PROCEDURE — 36415 COLL VENOUS BLD VENIPUNCTURE: CPT | Performed by: STUDENT IN AN ORGANIZED HEALTH CARE EDUCATION/TRAINING PROGRAM

## 2022-02-27 PROCEDURE — 86308 HETEROPHILE ANTIBODY SCREEN: CPT | Performed by: STUDENT IN AN ORGANIZED HEALTH CARE EDUCATION/TRAINING PROGRAM

## 2022-02-27 NOTE — PROGRESS NOTES
Assessment & Plan     Night sweats  Patient presents with night sweats, dry cough, low grade fevers, headaches for the past 2 weeks. He has a general sense of not feeling well. He was exposed to friend with Covid 2 weeks ago but he has test four times at home for Covid and all have been negative. No history of asthma or lung disease. He has never smoked. He has a family history significant for early cardiac disease in his father in his 40s. His blood work is unremarkable other than elevation in ALT. Tests for strep, flu and mono are negative. His chest x-ray shows prominence of the interstitial lungs which may be viral in nature however, I would have expected his fevers and symptoms to have resolved after 2 weeks duration. With night sweats for the past 2 weeks I recommend further evaluation in [facility] tomorrow to perform CT of the chest.  - Streptococcus A Rapid Screen w/Reflex to PCR - Clinic Collect  - Influenza A & B Antigen - Clinic Collect  - Mononucleosis screen  - CBC with platelets  - ESR: Erythrocyte sedimentation rate  - Comprehensive metabolic panel (BMP + Alb, Alk Phos, ALT, AST, Total. Bili, TP)  - XR Chest 2 Views  - TSH with free T4 reflex  - EKG 12-lead complete w/read - Clinics  - Group A Streptococcus PCR Throat Swab  - Mononucleosis screen  - CBC with platelets  - ESR: Erythrocyte sedimentation rate  - Comprehensive metabolic panel (BMP + Alb, Alk Phos, ALT, AST, Total. Bili, TP)  - TSH with free T4 reflex    Elevated BP without diagnosis of hypertension  - EKG 12-lead complete w/read - Clinics     50 minutes spent on the date of the encounter doing chart review, review of test results, interpretation of tests, patient visit and documentation         No follow-ups on file.    Kari Shields, [facility]    Lizzie Motley is a 31 year old male who presents to clinic today for the following health issues:  Chief Complaint   Patient presents  with     URI     Onset- Two weeks ago.  He had lunch with a friend on February 14 and 3 days later that friend tested positive for Covid.  1 day later he started having intermittent low grade fever 99.4, 99.5, temp at 100 Saturday, chills, sore throat 1 day during the course of the past 1.5 weeks but this has resolved, headache for 4-5 days the first week of feeling sick, fatigue.  He figured it must be Covid so he tested 4 times at home and all of the tests were negative.  His son developed some cold symptoms a couple days ago and he also tested negative for Covid.  He had his wife and other child test and they also were both negative for Covid.  Has had a mild cough past couple days. Headache 17th-22nd on the right side of head which has now resolved. Has had night sweats every night recently but thought maybe this was from the fever. He has had no changes in bowel or bladder habits.  He has urinated a little more frequently however he has been pushing his fluid intake.  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     Patient Active Problem List   Diagnosis     Family history of early CAD     No current outpatient medications on file.     No current facility-administered medications for this visit.         Review of Systems  Constitutional, HEENT, cardiovascular, pulmonary, GI, , musculoskeletal, neuro, skin, endocrine and psych systems are negative, except as otherwise noted.      Objective    BP (!) 150/104   Pulse 102   Temp 98.5  F (36.9  C) (Tympanic)   Wt 92.8 kg (204 lb 9.6 oz)   SpO2 100%   BMI 28.54 kg/m    Physical Exam   GENERAL: alert and no distress  EYES: Eyes grossly normal to inspection, PERRL and conjunctivae and sclerae  normal  HENT: ear canals and TM's normal, nose and mouth without ulcers or lesions  NECK: no adenopathy, no asymmetry, masses, or scars and thyroid normal to palpation  RESP: lungs clear to auscultation - no rales, rhonchi or wheezes  CV: regular rate and rhythm, normal S1 S2, no S3 or S4, no murmur, click or rub, no peripheral edema   ABDOMEN: soft, nontender, no hepatosplenomegaly, no masses and bowel sounds normal  MS: no gross musculoskeletal defects noted, no edema  SKIN: no suspicious lesions or rashes  NEURO: Normal strength and tone, mentation intact and speech normal  BACK: no CVA tenderness, no paralumbar tenderness  PSYCH: mentation appears normal, affect normal/bright  LYMPH: no cervical, supraclavicular, axillary, or inguinal adenopathy    Results for orders placed or performed in visit on 02/27/22   XR Chest 2 Views     Status: None    Narrative    EXAM: XR CHEST 2 VW  LOCATION: [facility]  DATE/TIME: 2/27/2022 2:55 PM    INDICATION:  Fever, unspecified fever cause  COMPARISON: None.      Impression    IMPRESSION:   Mild prominence of the pulmonary interstitium, nonspecific but can be seen with bronchial wall inflammatory change such as that seen with viral type illness or reactive airway disease. No discrete consolidative pulmonary infiltrate, pleural effusion or   pneumothorax. Chest otherwise negative.   Results for orders placed or performed in visit on 02/27/22   Mononucleosis screen     Status: Normal   Result Value Ref Range    Mononucleosis Screen Negative Negative   CBC with platelets     Status: Normal   Result Value Ref Range    WBC Count 8.6 4.0 - 11.0 10e3/uL    RBC Count 5.00 4.40 - 5.90 10e6/uL    Hemoglobin 15.1 13.3 - 17.7 g/dL    Hematocrit 42.9 40.0 - 53.0 %    MCV 86 78 - 100 fL    MCH 30.2 26.5 - 33.0 pg    MCHC 35.2 31.5 - 36.5 g/dL    RDW 11.9 10.0 - 15.0 %    Platelet Count 260 150 - 450 10e3/uL   ESR: Erythrocyte sedimentation rate     Status: Normal    Result Value Ref Range    Erythrocyte Sedimentation Rate 6 0 - 15 mm/hr   Comprehensive metabolic panel (BMP + Alb, Alk Phos, ALT, AST, Total. Bili, TP)     Status: Abnormal   Result Value Ref Range    Sodium 137 133 - 144 mmol/L    Potassium 4.2 3.4 - 5.3 mmol/L    Chloride 103 94 - 109 mmol/L    Carbon Dioxide (CO2) 28 20 - 32 mmol/L    Anion Gap 6 3 - 14 mmol/L    Urea Nitrogen 14 7 - 30 mg/dL    Creatinine 0.98 0.66 - 1.25 mg/dL    Calcium 9.2 8.5 - 10.1 mg/dL    Glucose 103 (H) 70 - 99 mg/dL    Alkaline Phosphatase 84 40 - 150 U/L    AST 51 (H) 0 - 45 U/L     (H) 0 - 70 U/L    Protein Total 7.7 6.8 - 8.8 g/dL    Albumin 4.4 3.4 - 5.0 g/dL    Bilirubin Total 0.8 0.2 - 1.3 mg/dL    GFR Estimate >90 >60 mL/min/1.73m2   TSH with free T4 reflex     Status: Normal   Result Value Ref Range    TSH 0.70 0.40 - 4.00 mU/L   Streptococcus A Rapid Screen w/Reflex to PCR - Clinic Collect     Status: Normal    Specimen: Throat; Swab   Result Value Ref Range    Group A Strep antigen Negative Negative   Influenza A & B Antigen - Clinic Collect     Status: Normal    Specimen: Nose; Swab   Result Value Ref Range    Influenza A antigen Negative Negative    Influenza B antigen Negative Negative    Narrative    Test results must be correlated with clinical data. If necessary, results should be confirmed by a molecular assay or viral culture.   Group A Streptococcus PCR Throat Swab     Status: Normal    Specimen: Throat; Swab   Result Value Ref Range    Group A strep by PCR Not Detected Not Detected    Narrative    The Xpert Xpress Strep A test, performed on the [employer], is a rapid, qualitative in vitro diagnostic test for the detection of Streptococcus pyogenes (Group A ß-hemolytic Streptococcus, Strep A) in throat swab specimens from patients with signs and symptoms of pharyngitis. The Xpert Xpress Strep A test can be used as an aid in the diagnosis of Group A Streptococcal pharyngitis. The assay is  not intended to monitor treatment for Group A Streptococcus infections. The Xpert Xpress Strep A test utilizes an automated real-time polymerase chain reaction (PCR) to detect Streptococcus pyogenes DNA.

## 2022-02-28 ENCOUNTER — ANCILLARY PROCEDURE (OUTPATIENT)
Dept: CT IMAGING | Facility: CLINIC | Age: 32
End: 2022-02-28
Attending: STUDENT IN AN ORGANIZED HEALTH CARE EDUCATION/TRAINING PROGRAM
Payer: COMMERCIAL

## 2022-02-28 ENCOUNTER — OFFICE VISIT (OUTPATIENT)
Dept: PEDIATRICS | Facility: CLINIC | Age: 32
End: 2022-02-28
Payer: COMMERCIAL

## 2022-02-28 VITALS
SYSTOLIC BLOOD PRESSURE: 133 MMHG | HEART RATE: 82 BPM | OXYGEN SATURATION: 99 % | TEMPERATURE: 99.1 F | RESPIRATION RATE: 18 BRPM | DIASTOLIC BLOOD PRESSURE: 84 MMHG

## 2022-02-28 DIAGNOSIS — R61 NIGHT SWEATS: ICD-10-CM

## 2022-02-28 DIAGNOSIS — J20.9 ACUTE BRONCHITIS WITH SYMPTOMS > 10 DAYS: Primary | ICD-10-CM

## 2022-02-28 DIAGNOSIS — J84.9 INTERSTITIAL LUNG DISORDERS (H): ICD-10-CM

## 2022-02-28 DIAGNOSIS — R74.01 ELEVATED ALT MEASUREMENT: ICD-10-CM

## 2022-02-28 LAB
ALBUMIN UR-MCNC: NEGATIVE MG/DL
APPEARANCE UR: CLEAR
BASOPHILS # BLD AUTO: 0.1 10E3/UL (ref 0–0.2)
BASOPHILS NFR BLD AUTO: 1 %
BILIRUB UR QL STRIP: NEGATIVE
COLOR UR AUTO: YELLOW
EOSINOPHIL # BLD AUTO: 0.3 10E3/UL (ref 0–0.7)
EOSINOPHIL NFR BLD AUTO: 3 %
ERYTHROCYTE [DISTWIDTH] IN BLOOD BY AUTOMATED COUNT: 12 % (ref 10–15)
FERRITIN SERPL-MCNC: 241 NG/ML (ref 26–388)
GLUCOSE UR STRIP-MCNC: NEGATIVE MG/DL
HCT VFR BLD AUTO: 42.6 % (ref 40–53)
HGB BLD-MCNC: 14.8 G/DL (ref 13.3–17.7)
HGB UR QL STRIP: NEGATIVE
HOLD SPECIMEN: NORMAL
IMM GRANULOCYTES # BLD: 0.1 10E3/UL
IMM GRANULOCYTES NFR BLD: 1 %
KETONES UR STRIP-MCNC: NEGATIVE MG/DL
LDH SERPL L TO P-CCNC: 272 U/L (ref 85–227)
LEUKOCYTE ESTERASE UR QL STRIP: NEGATIVE
LYMPHOCYTES # BLD AUTO: 3.1 10E3/UL (ref 0.8–5.3)
LYMPHOCYTES NFR BLD AUTO: 36 %
MCH RBC QN AUTO: 30.1 PG (ref 26.5–33)
MCHC RBC AUTO-ENTMCNC: 34.7 G/DL (ref 31.5–36.5)
MCV RBC AUTO: 87 FL (ref 78–100)
MONOCYTES # BLD AUTO: 0.9 10E3/UL (ref 0–1.3)
MONOCYTES NFR BLD AUTO: 10 %
NEUTROPHILS # BLD AUTO: 4.1 10E3/UL (ref 1.6–8.3)
NEUTROPHILS NFR BLD AUTO: 49 %
NITRATE UR QL: NEGATIVE
NRBC # BLD AUTO: 0 10E3/UL
NRBC BLD AUTO-RTO: 0 /100
PH UR STRIP: 7 [PH] (ref 5–7)
PLAT MORPH BLD: NORMAL
PLATELET # BLD AUTO: 268 10E3/UL (ref 150–450)
RBC # BLD AUTO: 4.91 10E6/UL (ref 4.4–5.9)
RBC MORPH BLD: NORMAL
SARS-COV-2 RNA RESP QL NAA+PROBE: NEGATIVE
SKIP: NORMAL
SP GR UR STRIP: 1.01 (ref 1–1.03)
UROBILINOGEN UR STRIP-MCNC: NORMAL MG/DL
WBC # BLD AUTO: 8.6 10E3/UL (ref 4–11)

## 2022-02-28 PROCEDURE — 71260 CT THORAX DX C+: CPT | Mod: GC | Performed by: STUDENT IN AN ORGANIZED HEALTH CARE EDUCATION/TRAINING PROGRAM

## 2022-02-28 PROCEDURE — 82728 ASSAY OF FERRITIN: CPT | Performed by: STUDENT IN AN ORGANIZED HEALTH CARE EDUCATION/TRAINING PROGRAM

## 2022-02-28 PROCEDURE — 81003 URINALYSIS AUTO W/O SCOPE: CPT | Performed by: STUDENT IN AN ORGANIZED HEALTH CARE EDUCATION/TRAINING PROGRAM

## 2022-02-28 PROCEDURE — 36415 COLL VENOUS BLD VENIPUNCTURE: CPT | Performed by: STUDENT IN AN ORGANIZED HEALTH CARE EDUCATION/TRAINING PROGRAM

## 2022-02-28 PROCEDURE — 83615 LACTATE (LD) (LDH) ENZYME: CPT | Performed by: STUDENT IN AN ORGANIZED HEALTH CARE EDUCATION/TRAINING PROGRAM

## 2022-02-28 PROCEDURE — 85025 COMPLETE CBC W/AUTO DIFF WBC: CPT | Performed by: STUDENT IN AN ORGANIZED HEALTH CARE EDUCATION/TRAINING PROGRAM

## 2022-02-28 PROCEDURE — U0005 INFEC AGEN DETEC AMPLI PROBE: HCPCS | Performed by: STUDENT IN AN ORGANIZED HEALTH CARE EDUCATION/TRAINING PROGRAM

## 2022-02-28 PROCEDURE — 99215 OFFICE O/P EST HI 40 MIN: CPT | Performed by: STUDENT IN AN ORGANIZED HEALTH CARE EDUCATION/TRAINING PROGRAM

## 2022-02-28 PROCEDURE — U0003 INFECTIOUS AGENT DETECTION BY NUCLEIC ACID (DNA OR RNA); SEVERE ACUTE RESPIRATORY SYNDROME CORONAVIRUS 2 (SARS-COV-2) (CORONAVIRUS DISEASE [COVID-19]), AMPLIFIED PROBE TECHNIQUE, MAKING USE OF HIGH THROUGHPUT TECHNOLOGIES AS DESCRIBED BY CMS-2020-01-R: HCPCS | Performed by: STUDENT IN AN ORGANIZED HEALTH CARE EDUCATION/TRAINING PROGRAM

## 2022-02-28 RX ORDER — AZITHROMYCIN 250 MG/1
TABLET, FILM COATED ORAL
Qty: 6 TABLET | Refills: 0 | Status: SHIPPED | OUTPATIENT
Start: 2022-02-28 | End: 2022-03-05

## 2022-02-28 RX ORDER — IOPAMIDOL 755 MG/ML
101 INJECTION, SOLUTION INTRAVASCULAR ONCE
Status: COMPLETED | OUTPATIENT
Start: 2022-02-28 | End: 2022-02-28

## 2022-02-28 RX ORDER — PREDNISONE 20 MG/1
40 TABLET ORAL DAILY
Qty: 10 TABLET | Refills: 0 | Status: SHIPPED | OUTPATIENT
Start: 2022-02-28 | End: 2022-03-05

## 2022-02-28 RX ADMIN — IOPAMIDOL 101 ML: 755 INJECTION, SOLUTION INTRAVASCULAR at 12:27

## 2022-02-28 NOTE — PATIENT INSTRUCTIONS
CT of the lungs preliminary report is read as normal. The official report will come back within the next couple hours.     I recommend treating the inflammation in the lungs with an oral steroid called prednisone 40 mg once daily for 5 days and treating the cough with fevers with an antibiotic called azithromycin 2 tablets today and 1 tablet daily for 4 days starting tomorrow.     Prednisone may cause side effects of irritability, increased appetite and insomnia.     I recommend following up in a couple weeks with a primary care provider to recheck your symptoms and to recheck your liver function tests and LDH.     Dilcia Shields, CNP

## 2022-02-28 NOTE — PROGRESS NOTES
Assessment & Plan     Acute bronchitis with symptoms > 10 days  Interstitial lung inflammation  Night sweats  CT of the chest was done to further evaluate the interstitial prominence seen on the chest x-ray yesterday along with 2 weeks fevers and night sweats. He does have frequent contact with immigrants at work and at weekly [employer]. He has had no hemoptysis. I would have expected if this were TB to show up on imaging so I am not going to proceed with further evaluation for TB though exposure risk is noted. The chest CT is negative. I advised doing a PCR test for Covid though he has had 4 negative home Covid tests, I think it is reasonable to do the clinical test to completely rule this out. I am going to treat him for the inflammation in the airways with prednisone and treat the fevers and cough that have been progressive for the past 2 weeks with azithromycin. He will see a primary care doctor in 10-14 days to follow up and repeat ALT and LDH (see notes below).   - predniSONE (DELTASONE) 20 MG tablet; Take 2 tablets (40 mg) by mouth daily for 5 days  - azithromycin (ZITHROMAX) 250 MG tablet; Take 2 tablets (500 mg) by mouth daily for 1 day, THEN 1 tablet (250 mg) daily for 4 days.  - CT Chest w Contrast; Future  - CT Chest w Contrast  - UA macro with reflex to Microscopic and Culture - Clinc Collect  - CBC with platelets and differential  - Lactate Dehydrogenase  - Ferritin  - Symptomatic; Yes; 2/17/2022 COVID-19 Virus (Coronavirus) by PCR Nose  - sodium chloride (PF) 0.9% PF flush 3 mL    Elevated ALT  Advised to schedule appointment with primary care to recheck symptoms and to recheck ALT and LDH. I suspect he may have an underlying viral condition that is causing transient elevations in these levels and would anticipate these will normalize in a couple weeks unless due to other etiology such as hepatitis, malignancy or other etiology. He has no acute GI symptoms to suggest need for further work up at this  time. On the CT, the liver was visualized and appeared normal. Patient agrees to schedule appointment within the next 2 weeks with primary care.     70 minutes spent on the date of the encounter doing chart review, review of test results, interpretation of tests, patient visit, documentation and discussion with family        Patient Instructions   CT of the lungs preliminary report is read as normal. The official report will come back within the next couple hours.     I recommend treating the inflammation in the lungs with an oral steroid called prednisone 40 mg once daily for 5 days and treating the cough with fevers with an antibiotic called azithromycin 2 tablets today and 1 tablet daily for 4 days starting tomorrow.     Prednisone may cause side effects of irritability, increased appetite and insomnia.     I recommend following up in a couple weeks with a primary care provider to recheck your symptoms and to recheck your liver function tests and LDH.     Dilcia Shields CNP         No follow-ups on file.    ELVIN Baez CNP  [facility]    Lizzie Motley is a 31 year old who presents for the following health issues accompanied by his mother     HPI     Acute Illness  Acute illness concerns: Cough, headache, chills, cough  Onset/Duration: Thursday, February 17th  Symptoms:  Fever: YES  Chills/Sweats: YES  Headache (location?): YES  Sinus Pressure: no  Conjunctivitis:  no  Ear Pain: no  Rhinorrhea: no  Congestion: no  Sore Throat: no  Cough: YES-non-productive  Wheeze: no  Decreased Appetite: no  GI: belching for the past 2 years, no early satiety, bloating, changes in bowel habits, changes in appetite, nausea or general sense of fullness  Nausea: no  Vomiting: no  Diarrhea: no  Dysuria/Freq.: increase in frequency but he has also increased water intake, drinks 4 cups of coffee daily  Dysuria or Hematuria: no  Fatigue/Achiness: YES  Sick/Strep Exposure: YES, Covid  exposure on 2/14, has had 4 negative Covid tests at home   Therapies tried and outcome: None    Reports chronic intermittent cough since he was a kid, occasional brief cough every couple hours. Was pointed out to him as a child by his friends.     History of chest discomfort on the left side for a few years and was seen a couple times and ultimately attributed the discomfort to anxiety. 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 Past exposure to diesel fumes at work. 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   [place]. 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 He and a friend did remediation with respirators. Thinks there may be mold in other places in the house.   No recent travel  Unsure if he was vaccinated for hepatitis B   Covid vaccine 1/18/22  He does have frequent contact with immigrants at work and at weekly [employer]. He has had no hemoptysis    Review of Systems   Constitutional, HEENT, cardiovascular, pulmonary, GI, , musculoskeletal, neuro, skin, endocrine and psych systems are negative, except as otherwise noted.    Patient was exposed to Covid 2 weeks ago. He states that he has tested 3 times and they have all been negative.       Objective    /84 (BP Location: Right arm, Patient Position: Sitting, Cuff Size: Adult Regular)   Pulse 82   Temp 99.1  F (37.3  C) (Oral)   Resp 18   SpO2 99%   There is no height or weight on file to calculate BMI.  Physical Exam   GENERAL: healthy, alert and no distress  EYES: Eyes grossly normal to inspection, PERRL and conjunctivae and sclerae normal  HENT: ear canals and TM's normal, nose and mouth without ulcers or lesions  NECK: no adenopathy, no asymmetry, masses, or scars and  thyroid normal to palpation  RESP: lungs clear to auscultation - no rales, rhonchi or wheezes  CV: regular rate and rhythm, normal S1 S2, no S3 or S4, no murmur, click or rub, no peripheral edema   ABDOMEN: soft, nontender, without splenomegaly or masses, ? Increased liver span  MS: no gross musculoskeletal defects noted, no edema  SKIN: no suspicious lesions or rashes  NEURO: Normal strength and tone, mentation intact and speech normal  PSYCH: mentation appears normal, affect normal/bright  LYMPH: no cervical, supraclavicular, axillary, or inguinal adenopathy    Results for orders placed or performed in visit on 02/28/22 (from the past 24 hour(s))   UA macro with reflex to Microscopic and Culture - Clinc Collect    Specimen: Urine, Midstream   Result Value Ref Range    Color Urine Yellow Colorless, Straw, Light Yellow, Yellow    Appearance Urine Clear Clear    Glucose Urine Negative Negative mg/dL    Bilirubin Urine Negative Negative    Ketones Urine Negative Negative mg/dL    Specific Gravity Urine 1.013 1.003 - 1.035    Blood Urine Negative Negative    pH Urine 7.0 5.0 - 7.0    Protein Albumin Urine Negative Negative mg/dL    Urobilinogen Urine Normal Normal, 2.0 mg/dL    Nitrite Urine Negative Negative    Leukocyte Esterase Urine Negative Negative    SKIP      Narrative    Microscopic not indicated   CBC with platelets and differential    Narrative    The following orders were created for panel order CBC with platelets and differential.  Procedure                               Abnormality         Status                     ---------                               -----------         ------                     CBC with platelets and d...[[id number]]                      Final result               RBC and Platelet Morphology[[id number]]                      Final result                 Please view results for these tests on the individual orders.   Lactate Dehydrogenase   Result Value Ref Range    Lactate  Dehydrogenase 272 (H) 85 - 227 U/L   Ferritin   Result Value Ref Range    Ferritin 241 26 - 388 ng/mL   CBC with platelets and differential   Result Value Ref Range    WBC Count 8.6 4.0 - 11.0 10e3/uL    RBC Count 4.91 4.40 - 5.90 10e6/uL    Hemoglobin 14.8 13.3 - 17.7 g/dL    Hematocrit 42.6 40.0 - 53.0 %    MCV 87 78 - 100 fL    MCH 30.1 26.5 - 33.0 pg    MCHC 34.7 31.5 - 36.5 g/dL    RDW 12.0 10.0 - 15.0 %    Platelet Count 268 150 - 450 10e3/uL    % Neutrophils 49 %    % Lymphocytes 36 %    % Monocytes 10 %    % Eosinophils 3 %    % Basophils 1 %    % Immature Granulocytes 1 %    NRBCs per 100 WBC 0 <1 /100    Absolute Neutrophils 4.1 1.6 - 8.3 10e3/uL    Absolute Lymphocytes 3.1 0.8 - 5.3 10e3/uL    Absolute Monocytes 0.9 0.0 - 1.3 10e3/uL    Absolute Eosinophils 0.3 0.0 - 0.7 10e3/uL    Absolute Basophils 0.1 0.0 - 0.2 10e3/uL    Absolute Immature Granulocytes 0.1 <=0.4 10e3/uL    Absolute NRBCs 0.0 10e3/uL   RBC and Platelet Morphology   Result Value Ref Range    Platelet Assessment  Automated Count Confirmed. Platelet morphology is normal.     Automated Count Confirmed. Platelet morphology is normal.    RBC Morphology Confirmed RBC Indices    CT Chest w Contrast    Narrative    EXAMINATION: CT CHEST W CONTRAST, 2/28/2022 12:49 PM    CLINICAL HISTORY: Interstitial lung disorders (H); Night sweats cough  and night sweats for 2 weeks, chest x-ray shows prominence of  interstitium.  Covid test pending.    COMPARISON: Radiograph 2/27/2022.    TECHNIQUE: CT imaging obtained through the chest without contrast.  Coronal, sagittal and axial MIP reformatted images obtained and  reviewed.     CONTRAST: iopamidol (ISOVUE-370) solution 101 mL    FINDINGS:    Lungs: No suspicious pulmonary nodule. The central tracheobronchial  tree is patent. No areas of bronchiectasis or architectural  distortion. No pleural effusion, pulmonary consolidation, or  pneumothorax. Mild bibasilar atelectasis.     Mediastinum: The visualized  thyroid is unremarkable.Heart size is in  the upper limits of normal. No pericardial effusion. The thoracic  aorta and main pulmonary artery are within normal limits. Standard  branching pattern of the great vessels. No abnormal thoracic lymph  nodes.    Bones and soft tissues:  No substantial degenerative change.  No  suspicious osseous lesion.    Upper abdomen:  Limited evaluation of the upper abdomen. No acute  pathology of the visualized solid organs.       Impression    IMPRESSION: No CT finding to explain patient's symptoms.    I have personally reviewed the examination and initial interpretation  and I agree with the findings.    WILD CLANCY MD         SYSTEM ID:  [id number]     No results found for this visit on 02/28/22.  XR Chest 2 Views    Result Date: 2/27/2022  EXAM: XR CHEST 2 VW LOCATION: [facility] DATE/TIME: 2/27/2022 2:55 PM INDICATION:  Fever, unspecified fever cause COMPARISON: None.     IMPRESSION: Mild prominence of the pulmonary interstitium, nonspecific but can be seen with bronchial wall inflammatory change such as that seen with viral type illness or reactive airway disease. No discrete consolidative pulmonary infiltrate, pleural effusion or pneumothorax. Chest otherwise negative.

## 2022-03-13 ENCOUNTER — OFFICE VISIT (OUTPATIENT)
Dept: URGENT CARE | Facility: URGENT CARE | Age: 32
End: 2022-03-13
Payer: COMMERCIAL

## 2022-03-13 VITALS
OXYGEN SATURATION: 99 % | RESPIRATION RATE: 16 BRPM | BODY MASS INDEX: 28.48 KG/M2 | TEMPERATURE: 100.1 F | WEIGHT: 204.19 LBS | DIASTOLIC BLOOD PRESSURE: 102 MMHG | HEART RATE: 118 BPM | SYSTOLIC BLOOD PRESSURE: 153 MMHG

## 2022-03-13 DIAGNOSIS — R50.9 ACUTE FEBRILE ILLNESS: ICD-10-CM

## 2022-03-13 DIAGNOSIS — R05.9 COUGH: ICD-10-CM

## 2022-03-13 DIAGNOSIS — R03.0 ELEVATED BP WITHOUT DIAGNOSIS OF HYPERTENSION: ICD-10-CM

## 2022-03-13 DIAGNOSIS — R07.0 THROAT PAIN: ICD-10-CM

## 2022-03-13 DIAGNOSIS — J06.9 VIRAL URI WITH COUGH: ICD-10-CM

## 2022-03-13 DIAGNOSIS — R79.89 ELEVATED LFTS: ICD-10-CM

## 2022-03-13 DIAGNOSIS — J10.1 INFLUENZA A: Primary | ICD-10-CM

## 2022-03-13 LAB
DEPRECATED S PYO AG THROAT QL EIA: NEGATIVE
FLUAV AG SPEC QL IA: POSITIVE
FLUBV AG SPEC QL IA: NEGATIVE
GROUP A STREP BY PCR: NOT DETECTED

## 2022-03-13 PROCEDURE — 87804 INFLUENZA ASSAY W/OPTIC: CPT | Performed by: INTERNAL MEDICINE

## 2022-03-13 PROCEDURE — U0005 INFEC AGEN DETEC AMPLI PROBE: HCPCS | Performed by: INTERNAL MEDICINE

## 2022-03-13 PROCEDURE — 87651 STREP A DNA AMP PROBE: CPT | Performed by: INTERNAL MEDICINE

## 2022-03-13 PROCEDURE — U0003 INFECTIOUS AGENT DETECTION BY NUCLEIC ACID (DNA OR RNA); SEVERE ACUTE RESPIRATORY SYNDROME CORONAVIRUS 2 (SARS-COV-2) (CORONAVIRUS DISEASE [COVID-19]), AMPLIFIED PROBE TECHNIQUE, MAKING USE OF HIGH THROUGHPUT TECHNOLOGIES AS DESCRIBED BY CMS-2020-01-R: HCPCS | Performed by: INTERNAL MEDICINE

## 2022-03-13 PROCEDURE — 99214 OFFICE O/P EST MOD 30 MIN: CPT | Mod: CS | Performed by: INTERNAL MEDICINE

## 2022-03-13 RX ORDER — OSELTAMIVIR PHOSPHATE 75 MG/1
75 CAPSULE ORAL 2 TIMES DAILY
Qty: 10 CAPSULE | Refills: 0 | Status: SHIPPED | OUTPATIENT
Start: 2022-03-13 | End: 2022-03-18

## 2022-03-13 ASSESSMENT — ENCOUNTER SYMPTOMS
HEADACHES: 1
MYALGIAS: 0
DIARRHEA: 0
CHILLS: 1
WHEEZING: 0
SHORTNESS OF BREATH: 0

## 2022-03-13 NOTE — NURSING NOTE
The patient is fully vaccinated for COVID-19 with the Moderna vaccine. He had his booster dose in December 2021.  Judy Guzman MA

## 2022-03-13 NOTE — PATIENT INSTRUCTIONS
tamiflu 2 x day for 5 days    Fluids  Rest  Fever control    Recheck if symptoms persist more than 2 weeks  Call or return to clinic if symptoms worsen or fail to improve as anticipated.    ibuprofen 400 mg 3 x day with food.    Recheck with clinic 1 month with repeat liver tests.          Patient Education     The Flu (Influenza)  Updated for the 9560-5070 flu season   The flu (influenza) is a viral infection that affects your respiratory tract. The respiratory tract is made up of your mouth, nose, and lungs, and the passages between them. Unlike a cold, the flu can make you very ill. It may lead to pneumonia, a serious lung infection. The flu can have serious complications and even cause death.  Because of the COVID-19 pandemic, experts strongly advise getting a flu vaccine during 8940-2858 to protect yourself, your family, and others. Flu viruses and the COVID-19 virus are both likely to spread during flu season. A flu vaccine will help save medical resources to care for people with COVID-19. People at high risk for complications from the flu are also likely to be at high risk for serious problems from COVID-19, so it's important to get a flu vaccine.     Viruses that cause influenza spread through the air in droplets when someone who has the flu coughs, sneezes, laughs, or talks.   Who is at risk for the flu?  Anyone can get the flu. But you are more likely to become infected if you:    Have a weak immune system    Work in a healthcare setting where you may be exposed to flu germs    Live or work with someone who has the flu    Haven t had the flu vaccine as advised  How does the flu spread?  The flu is caused by a virus. The virus spreads through the air in droplets when someone who has the flu coughs, sneezes, laughs, or talks. You can become infected when you inhale these viruses directly. You can also become infected when you touch a surface on which the droplets have landed and then transfer the germs  to your eyes, nose, or mouth. Touching used tissues, or sharing utensils, drinking glasses, or a toothbrush from an infected person can expose you to flu viruses, too.  What are the symptoms of the flu?  Flu symptoms tend to come on quickly and may last a few days to a few weeks. They include:    Fever usually higher than 100.4  F  ( 38 C ) and chills    Sore throat and headache    Dry cough    Runny nose    Tiredness and weakness    Muscle aches  Who is at risk for flu complications?  For some people, the flu can be very serious. The risk for complications is greater for:    Children younger than age 5    Adults ages 65 and older    People with a chronic illness such as diabetes or heart, kidney, or lung disease    People with a weak immune system such as those with HIV, AIDS, or cancer,or those who have had a transplant or are taking immune suppressing medicines    People who live in a nursing home or long-term care facility  How is the flu treated?  The flu usually gets better after 7 days or so. In some cases, your healthcare provider may prescribe an antiviral medicine. This may help you get well sooner. It also may reduce the risk for and severity of complications. For the medicine to help, you need to take it as soon as possible (ideally within 48 hours) after your symptoms start.  If you develop pneumonia or other serious illness, you may need to stay in the hospital.  Easing flu symptoms    Drink lots of fluids such as water, juice, and warm soup. A good rule is to drink enough so that you urinate your normal amount.    Get plenty of rest.    Ask your healthcare provider what to take for fever and pain. Don't give aspirin to children under 18 years of age. It can cause the serious illness Reye syndrome.    Call your provider if your fever is 100.4  F ( 38 C ) or higher, or you become dizzy, lightheaded, or short of breath.    Taking steps to protect others    Wash your hands often, especially after coughing  or sneezing. Or clean your hands with an alcohol-based hand  containing at least 60% alcohol.    Cough or sneeze into a tissue. Then throw the tissue away and wash your hands. If you don t have a tissue, cough and sneeze into your elbow.    Stay home until at least 24 hours after you no longer have a fever or chills. Be sure the fever isn t being hidden by fever-reducing medicine.    Don t share food, utensils, drinking glasses, or a toothbrush with others.    How can the flu be prevented?    One of the best ways to prevent the flu is to get a flu vaccine each year. The CDC and American Academy of Pediatrics recommend that all people 6 months of age and older get a flu vaccine every year. This includes pregnant women. Healthcare providers advise getting the flu vaccine each year as soon as it's available in your area.    Flu virus strains change from year to year, so the vaccine changes yearly to help prevent flu viruses predicted to cause illness during the upcoming flu season. For the 1726-8729 influenza season, the vaccine is available in different forms. It's most often given as a shot into the muscle. A nasal spray is available for healthy, non-pregnant people between ages 2 and 49 years. A needle-free form called a jet injector delivers the vaccine through the skin into the muscle through a high-pressure stream. This form may be an option for some people ages 18 to 64. Your healthcare provider can tell you which vaccine is right for you.    Wash your hands often. Frequent handwashing is a proven way to help prevent the spread of infection.    Carry an alcohol-based hand gel containing at least 60% alcohol. Use it when you can't use soap and water. Then wash your hands as soon as you can.    Try not to touch your eyes, nose, or mouth.    At home and work, clean phones, computer keyboards, and toys often with disinfectant wipes.    If possible, don't have close contact with others who have the flu or  symptoms of the flu.    Handwashing tips  Handwashing is one of the best ways to prevent many common infections. If you are caring for or visiting someone with the flu, wash your hands each time you enter and leave the room. Follow these steps:    Use clean, running water and plenty of soap. Rub your hands together well.    Clean the whole hand, including under your nails, between your fingers, and up the wrists.    Wash for at least 20 seconds.    Rinse, letting the water run down your fingers, not up your wrists.    Dry your hands well. Use a paper towel to turn off the faucet and open the door.  Using alcohol-based hand   Alcohol-based hand  are also a good choice. Use them when you can't use soap and water. Follow these steps:    Apply enough of the cleanser on your hands to cover all surfaces.    Rub your hands together briskly, cleaning the backs of your hands, the palms, between your fingers, and up the wrists.    Rub until the gel is gone and your hands are completely dry.  Preventing the flu in healthcare settings   The flu is a special concern for people in hospitals and long-term care facilities. To help prevent the spread of flu, many hospitals and nursing homes take these steps:    Healthcare providers wash their hands or use an alcohol-based hand  before and after treating each patient.    People with the flu have private rooms and bathrooms or share a room with someone with the same infection.    People who are at high risk for the flu but don't have it are encouraged to get the flu and pneumonia vaccines.    All healthcare workers are encouraged or required to get flu shots.  [employer] last reviewed this educational content on 4/1/2020 2000-2021 [employer]. All rights reserved. This information is not intended as a substitute for professional medical care. Always follow your healthcare professional's instructions.

## 2022-03-13 NOTE — PROGRESS NOTES
ASSESSMENT AND PLAN:      ICD-10-CM    1. Influenza A  J10.1 oseltamivir (TAMIFLU) 75 MG capsule   2. Throat pain  R07.0 Streptococcus A Rapid Screen w/Reflex to PCR - Clinic Collect     Group A Streptococcus PCR Throat Swab   3. Cough  R05.9 Symptomatic; Yes; 3/11/2022 COVID-19 Virus (Coronavirus) by PCR Nose     Influenza A & B Antigen - Clinic Collect   4. Acute febrile illness  R50.9    5. Viral URI with cough  J06.9    6. Elevated BP without diagnosis of hypertension  R03.0    7. Elevated LFTs  R79.89        Recommended taking ibuprofen instead aspirin during influenza illness.  Discussed risk of Reyes for children with aspirin so request that he not give aspirin to his children.  Family member should get tested if they develop symptoms.  Discussed his 6-month-old infant at home.  To contact pediatrician sometimes prophylaxis is given for Tamiflu at this age especially if underlying respiratory problems  Also at this time avoiding Tylenol due to elevated bated liver function test    Reassured him that his chest x-ray and chest CT looks good.  Due to recent respiratory illness requiring antibiotics and prednisone, I thought it was prudent to give him Tamiflu.      PLAN:      Patient Instructions         tamiflu 2 x day for 5 days    Fluids  Rest  Fever control    Recheck if symptoms persist more than 2 weeks  Call or return to clinic if symptoms worsen or fail to improve as anticipated.    ibuprofen 400 mg 3 x day with food.    Recheck with clinic 1 month with repeat liver tests.          Patient Education     The Flu (Influenza)  Updated for the 1228-2531 flu season   The flu (influenza) is a viral infection that affects your respiratory tract. The respiratory tract is made up of your mouth, nose, and lungs, and the passages between them. Unlike a cold, the flu can make you very ill. It may lead to pneumonia, a serious lung infection. The flu can have serious complications and even cause death.  Because of the  COVID-19 pandemic, experts strongly advise getting a flu vaccine during 6019-4834 to protect yourself, your family, and others. Flu viruses and the COVID-19 virus are both likely to spread during flu season. A flu vaccine will help save medical resources to care for people with COVID-19. People at high risk for complications from the flu are also likely to be at high risk for serious problems from COVID-19, so it's important to get a flu vaccine.     Viruses that cause influenza spread through the air in droplets when someone who has the flu coughs, sneezes, laughs, or talks.   Who is at risk for the flu?  Anyone can get the flu. But you are more likely to become infected if you:    Have a weak immune system    Work in a healthcare setting where you may be exposed to flu germs    Live or work with someone who has the flu    Haven t had the flu vaccine as advised  How does the flu spread?  The flu is caused by a virus. The virus spreads through the air in droplets when someone who has the flu coughs, sneezes, laughs, or talks. You can become infected when you inhale these viruses directly. You can also become infected when you touch a surface on which the droplets have landed and then transfer the germs to your eyes, nose, or mouth. Touching used tissues, or sharing utensils, drinking glasses, or a toothbrush from an infected person can expose you to flu viruses, too.  What are the symptoms of the flu?  Flu symptoms tend to come on quickly and may last a few days to a few weeks. They include:    Fever usually higher than 100.4  F  ( 38 C ) and chills    Sore throat and headache    Dry cough    Runny nose    Tiredness and weakness    Muscle aches  Who is at risk for flu complications?  For some people, the flu can be very serious. The risk for complications is greater for:    Children younger than age 5    Adults ages 65 and older    People with a chronic illness such as diabetes or heart, kidney, or lung  disease    People with a weak immune system such as those with HIV, AIDS, or cancer,or those who have had a transplant or are taking immune suppressing medicines    People who live in a nursing home or long-term care facility  How is the flu treated?  The flu usually gets better after 7 days or so. In some cases, your healthcare provider may prescribe an antiviral medicine. This may help you get well sooner. It also may reduce the risk for and severity of complications. For the medicine to help, you need to take it as soon as possible (ideally within 48 hours) after your symptoms start.  If you develop pneumonia or other serious illness, you may need to stay in the hospital.  Easing flu symptoms    Drink lots of fluids such as water, juice, and warm soup. A good rule is to drink enough so that you urinate your normal amount.    Get plenty of rest.    Ask your healthcare provider what to take for fever and pain. Don't give aspirin to children under 18 years of age. It can cause the serious illness Reye syndrome.    Call your provider if your fever is 100.4  F ( 38 C ) or higher, or you become dizzy, lightheaded, or short of breath.    Taking steps to protect others    Wash your hands often, especially after coughing or sneezing. Or clean your hands with an alcohol-based hand  containing at least 60% alcohol.    Cough or sneeze into a tissue. Then throw the tissue away and wash your hands. If you don t have a tissue, cough and sneeze into your elbow.    Stay home until at least 24 hours after you no longer have a fever or chills. Be sure the fever isn t being hidden by fever-reducing medicine.    Don t share food, utensils, drinking glasses, or a toothbrush with others.    How can the flu be prevented?    One of the best ways to prevent the flu is to get a flu vaccine each year. The CDC and American Academy of Pediatrics recommend that all people 6 months of age and older get a flu vaccine every year. This  includes pregnant women. Healthcare providers advise getting the flu vaccine each year as soon as it's available in your area.    Flu virus strains change from year to year, so the vaccine changes yearly to help prevent flu viruses predicted to cause illness during the upcoming flu season. For the 7965-2643 influenza season, the vaccine is available in different forms. It's most often given as a shot into the muscle. A nasal spray is available for healthy, non-pregnant people between ages 2 and 49 years. A needle-free form called a jet injector delivers the vaccine through the skin into the muscle through a high-pressure stream. This form may be an option for some people ages 18 to 64. Your healthcare provider can tell you which vaccine is right for you.    Wash your hands often. Frequent handwashing is a proven way to help prevent the spread of infection.    Carry an alcohol-based hand gel containing at least 60% alcohol. Use it when you can't use soap and water. Then wash your hands as soon as you can.    Try not to touch your eyes, nose, or mouth.    At home and work, clean phones, computer keyboards, and toys often with disinfectant wipes.    If possible, don't have close contact with others who have the flu or symptoms of the flu.    Handwashing tips  Handwashing is one of the best ways to prevent many common infections. If you are caring for or visiting someone with the flu, wash your hands each time you enter and leave the room. Follow these steps:    Use clean, running water and plenty of soap. Rub your hands together well.    Clean the whole hand, including under your nails, between your fingers, and up the wrists.    Wash for at least 20 seconds.    Rinse, letting the water run down your fingers, not up your wrists.    Dry your hands well. Use a paper towel to turn off the faucet and open the door.  Using alcohol-based hand   Alcohol-based hand  are also a good choice. Use them when you  can't use soap and water. Follow these steps:    Apply enough of the cleanser on your hands to cover all surfaces.    Rub your hands together briskly, cleaning the backs of your hands, the palms, between your fingers, and up the wrists.    Rub until the gel is gone and your hands are completely dry.  Preventing the flu in healthcare settings   The flu is a special concern for people in hospitals and long-term care facilities. To help prevent the spread of flu, many hospitals and nursing homes take these steps:    Healthcare providers wash their hands or use an alcohol-based hand  before and after treating each patient.    People with the flu have private rooms and bathrooms or share a room with someone with the same infection.    People who are at high risk for the flu but don't have it are encouraged to get the flu and pneumonia vaccines.    All healthcare workers are encouraged or required to get flu shots.  [employer] last reviewed this educational content on 4/1/2020 2000-2021 [employer]. All rights reserved. This information is not intended as a substitute for professional medical care. Always follow your healthcare professional's instructions.             No follow-ups on file.        Nell Marcelo MD  [facility]    Subjective     Jian Tolu Oliveira is a 31 year old who presents for Patient presents with:  Fever: Fever for 2 days, the highest temp was 102.3 last night  URI: Extremely Runny nose, no abd pain, no diarrhea, no bodyaches, had 2 negative at-home COVID-19 tests  Cough: Cough for 2 days  Pharyngitis: Sore throat for 1-2 days    an established patient of [facility].    URI Adult    Onset of symptoms was 2 day(s) ago.  Course of illness is worsening.      Current and Associated symptoms: fever, runny nose and cough - non-productive  Treatment measures tried include aspirin.  Predisposing factors include ill contact: Family member  and  .    Seen 2 weeks ago  treatment prednisone & zpak    Review of Systems   Constitutional: Positive for chills.   Respiratory: Negative for shortness of breath and wheezing.    Gastrointestinal: Negative for diarrhea.   Musculoskeletal: Negative for myalgias.   Neurological: Positive for headaches.       Component      Latest Ref Rng & Units 2/27/2022   WBC      4.0 - 11.0 10e3/uL 8.6   RBC Count      4.40 - 5.90 10e6/uL 5.00   Hemoglobin      13.3 - 17.7 g/dL 15.1   Hematocrit      40.0 - 53.0 % 42.9   MCV      78 - 100 fL 86   MCH      26.5 - 33.0 pg 30.2   MCHC      31.5 - 36.5 g/dL 35.2   RDW      10.0 - 15.0 % 11.9   Platelet Count      150 - 450 10e3/uL 260   % Neutrophils      %    % Lymphocytes      %    % Monocytes      %    % Eosinophils      %    % Basophils      %    % Immature Granulocytes      %    NRBCs per 100 WBC      <1 /100    Absolute Neutrophils      1.6 - 8.3 10e3/uL    Absolute Lymphocytes      0.8 - 5.3 10e3/uL    Absolute Monocytes      0.0 - 1.3 10e3/uL    Absolute Eosinophils      0.0 - 0.7 10e3/uL    Absolute Basophils      0.0 - 0.2 10e3/uL    Absolute Immature Granulocytes      <=0.4 10e3/uL    Absolute NRBCs      10e3/uL    Sodium      133 - 144 mmol/L 137   Potassium      3.4 - 5.3 mmol/L 4.2   Chloride      94 - 109 mmol/L 103   Carbon Dioxide      20 - 32 mmol/L 28   Anion Gap      3 - 14 mmol/L 6   Urea Nitrogen      7 - 30 mg/dL 14   Creatinine      0.66 - 1.25 mg/dL 0.98   Calcium      8.5 - 10.1 mg/dL 9.2   Glucose      70 - 99 mg/dL 103 (H)   Alkaline Phosphatase      40 - 150 U/L 84   AST      0 - 45 U/L 51 (H)   ALT      0 - 70 U/L 117 (H)   Protein Total      6.8 - 8.8 g/dL 7.7   Albumin      3.4 - 5.0 g/dL 4.4   Bilirubin Total      0.2 - 1.3 mg/dL 0.8   GFR Estimate      >60 mL/min/1.73m2 >90   Color Urine      Colorless, Straw, Light Yellow, Yellow    Appearance Urine      Clear    Glucose Urine      Negative mg/dL    Bilirubin Urine      Negative    Ketones Urine       Negative mg/dL    Specific Gravity Urine      1.003 - 1.035    Blood Urine      Negative    pH Urine      5.0 - 7.0    Protein Albumin Urine      Negative mg/dL    Urobilinogen mg/dL      Normal, 2.0 mg/dL    Nitrite Urine      Negative    Leukocyte Esterase Urine      Negative    Influenza A      Negative Negative   Influenza B      Negative Negative   Platelet Morphology      Automated Count Confirmed. Platelet morphology is normal.    RBC Morphology          Rapid Strep A Screen      Negative Negative   Strep Group A PCR      Not Detected Not Detected   Mononucleosis Screen      Negative Negative   Sed Rate      0 - 15 mm/hr 6   TSH      0.40 - 4.00 mU/L 0.70   Lactate Dehydrogenase      85 - 227 U/L    Ferritin      26 - 388 ng/mL      Component      Latest Ref Rng & Units 2/28/2022   WBC      4.0 - 11.0 10e3/uL 8.6   RBC Count      4.40 - 5.90 10e6/uL 4.91   Hemoglobin      13.3 - 17.7 g/dL 14.8   Hematocrit      40.0 - 53.0 % 42.6   MCV      78 - 100 fL 87   MCH      26.5 - 33.0 pg 30.1   MCHC      31.5 - 36.5 g/dL 34.7   RDW      10.0 - 15.0 % 12.0   Platelet Count      150 - 450 10e3/uL 268   % Neutrophils      % 49   % Lymphocytes      % 36   % Monocytes      % 10   % Eosinophils      % 3   % Basophils      % 1   % Immature Granulocytes      % 1   NRBCs per 100 WBC      <1 /100 0   Absolute Neutrophils      1.6 - 8.3 10e3/uL 4.1   Absolute Lymphocytes      0.8 - 5.3 10e3/uL 3.1   Absolute Monocytes      0.0 - 1.3 10e3/uL 0.9   Absolute Eosinophils      0.0 - 0.7 10e3/uL 0.3   Absolute Basophils      0.0 - 0.2 10e3/uL 0.1   Absolute Immature Granulocytes      <=0.4 10e3/uL 0.1   Absolute NRBCs      10e3/uL 0.0   Sodium      133 - 144 mmol/L    Potassium      3.4 - 5.3 mmol/L    Chloride      94 - 109 mmol/L    Carbon Dioxide      20 - 32 mmol/L    Anion Gap      3 - 14 mmol/L    Urea Nitrogen      7 - 30 mg/dL    Creatinine      0.66 - 1.25 mg/dL    Calcium      8.5 - 10.1 mg/dL    Glucose      70 - 99  mg/dL    Alkaline Phosphatase      40 - 150 U/L    AST      0 - 45 U/L    ALT      0 - 70 U/L    Protein Total      6.8 - 8.8 g/dL    Albumin      3.4 - 5.0 g/dL    Bilirubin Total      0.2 - 1.3 mg/dL    GFR Estimate      >60 mL/min/1.73m2    Color Urine      Colorless, Straw, Light Yellow, Yellow Yellow   Appearance Urine      Clear Clear   Glucose Urine      Negative mg/dL Negative   Bilirubin Urine      Negative Negative   Ketones Urine      Negative mg/dL Negative   Specific Gravity Urine      1.003 - 1.035 1.013   Blood Urine      Negative Negative   pH Urine      5.0 - 7.0 7.0   Protein Albumin Urine      Negative mg/dL Negative   Urobilinogen mg/dL      Normal, 2.0 mg/dL Normal   Nitrite Urine      Negative Negative   Leukocyte Esterase Urine      Negative Negative   Influenza A      Negative    Influenza B      Negative    Platelet Morphology      Automated Count Confirmed. Platelet morphology is normal. Automated Count Confirmed. Platelet morphology is normal.   RBC Morphology       Confirmed RBC Indices   Rapid Strep A Screen      Negative    Strep Group A PCR      Not Detected    Mononucleosis Screen      Negative    Sed Rate      0 - 15 mm/hr    TSH      0.40 - 4.00 mU/L    Lactate Dehydrogenase      85 - 227 U/L 272 (H)   Ferritin      26 - 388 ng/mL 241     INDICATION:  Fever, unspecified fever cause  COMPARISON: None.                                                                      IMPRESSION:   Mild prominence of the pulmonary interstitium, nonspecific but can be seen with bronchial wall inflammatory change such as that seen with viral type illness or reactive airway disease. No discrete consolidative pulmonary infiltrate, pleural effusion or   pneumothorax. Chest otherwise negative.    Narrative & Impression   EXAMINATION: CT CHEST W CONTRAST, 2/28/2022 12:49 PM     CLINICAL HISTORY: Interstitial lung disorders (H); Night sweats cough  and night sweats for 2 weeks, chest x-ray shows prominence  of  interstitium.  Covid test pending.     COMPARISON: Radiograph 2/27/2022.     TECHNIQUE: CT imaging obtained through the chest without contrast.  Coronal, sagittal and axial MIP reformatted images obtained and  reviewed.      CONTRAST: iopamidol (ISOVUE-370) solution 101 mL     FINDINGS:     Lungs: No suspicious pulmonary nodule. The central tracheobronchial  tree is patent. No areas of bronchiectasis or architectural  distortion. No pleural effusion, pulmonary consolidation, or  pneumothorax. Mild bibasilar atelectasis.      Mediastinum: The visualized thyroid is unremarkable.Heart size is in  the upper limits of normal. No pericardial effusion. The thoracic  aorta and main pulmonary artery are within normal limits. Standard  branching pattern of the great vessels. No abnormal thoracic lymph  nodes.     Bones and soft tissues:  No substantial degenerative change.  No  suspicious osseous lesion.     Upper abdomen:  Limited evaluation of the upper abdomen. No acute  pathology of the visualized solid organs.                                                                       IMPRESSION: No CT finding to explain patient's symptoms.     I have personally reviewed the examination and initial interpretation  and I agree with the findings.     WILD CLANCY MD         SYSTEM ID:  [id number]             Objective    BP (!) 153/102 (BP Location: Left arm, Patient Position: Sitting, Cuff Size: Adult Large)   Pulse 118   Temp 100.1  F (37.8  C) (Oral)   Resp 16   Wt 92.6 kg (204 lb 3 oz)   SpO2 99%   BMI 28.48 kg/m    Physical Exam  Vitals reviewed.   Constitutional:       Appearance: Normal appearance. He is not ill-appearing or toxic-appearing.   HENT:      Right Ear: Tympanic membrane normal.      Left Ear: Tympanic membrane normal.      Nose: Congestion present.      Mouth/Throat:      Mouth: Mucous membranes are moist.      Pharynx: Oropharynx is clear.   Eyes:      Conjunctiva/sclera: Conjunctivae normal.    Cardiovascular:      Rate and Rhythm: Normal rate and regular rhythm.      Pulses: Normal pulses.      Heart sounds: Normal heart sounds.   Pulmonary:      Effort: Pulmonary effort is normal.      Breath sounds: Normal breath sounds.   Neurological:      Mental Status: He is alert.            Results for orders placed or performed in visit on 03/13/22 (from the past 24 hour(s))   Streptococcus A Rapid Screen w/Reflex to PCR - Clinic Collect    Specimen: Throat; Swab   Result Value Ref Range    Group A Strep antigen Negative Negative   Influenza A & B Antigen - Clinic Collect    Specimen: Nose; Swab   Result Value Ref Range    Influenza A antigen Positive (A) Negative    Influenza B antigen Negative Negative    Narrative    Test results must be correlated with clinical data. If necessary, results should be confirmed by a molecular assay or viral culture.

## 2022-03-14 LAB — SARS-COV-2 RNA RESP QL NAA+PROBE: NEGATIVE

## 2022-04-10 ENCOUNTER — HEALTH MAINTENANCE LETTER (OUTPATIENT)
Age: 32
End: 2022-04-10

## 2022-04-21 ENCOUNTER — OFFICE VISIT (OUTPATIENT)
Dept: OPTOMETRY | Facility: CLINIC | Age: 32
End: 2022-04-21
Payer: COMMERCIAL

## 2022-04-21 DIAGNOSIS — H52.13 MYOPIA, BILATERAL: Primary | ICD-10-CM

## 2022-04-21 DIAGNOSIS — H52.223 REGULAR ASTIGMATISM OF BOTH EYES: ICD-10-CM

## 2022-04-21 PROCEDURE — 92310 CONTACT LENS FITTING OU: CPT | Mod: GA | Performed by: OPTOMETRIST

## 2022-04-21 PROCEDURE — 92004 COMPRE OPH EXAM NEW PT 1/>: CPT | Performed by: OPTOMETRIST

## 2022-04-21 PROCEDURE — 92015 DETERMINE REFRACTIVE STATE: CPT | Performed by: OPTOMETRIST

## 2022-04-21 ASSESSMENT — REFRACTION_MANIFEST
OS_AXIS: 082
OS_AXIS: 172
OD_CYLINDER: +1.00
OD_AXIS: 002
OD_SPHERE: -4.00
OD_CYLINDER: -1.00
OS_SPHERE: -4.50
OD_SPHERE: -4.75
OS_SPHERE: -4.00
OD_AXIS: 092
OS_CYLINDER: -1.25
OS_CYLINDER: +0.75
METHOD_AUTOREFRACTION: 1

## 2022-04-21 ASSESSMENT — VISUAL ACUITY
OD_CC: 20/20
OS_CC: 20/20
CORRECTION_TYPE: CONTACTS
OD_CC: 20/20
METHOD: SNELLEN - LINEAR
OS_CC: 20/20

## 2022-04-21 ASSESSMENT — CONF VISUAL FIELD
OD_NORMAL: 1
OS_NORMAL: 1

## 2022-04-21 ASSESSMENT — KERATOMETRY
OD_AXISANGLE2_DEGREES: 2
OS_K1POWER_DIOPTERS: 45.00
OS_AXISANGLE2_DEGREES: 174
OD_AXISANGLE_DEGREES: 92
OS_AXISANGLE_DEGREES: 84
OS_K2POWER_DIOPTERS: 47.25
OD_K1POWER_DIOPTERS: 45.00
OD_K2POWER_DIOPTERS: 46.75

## 2022-04-21 ASSESSMENT — REFRACTION_CURRENTRX
OD_CYLINDER: -0.75
OS_AXIS: 170
OD_SPHERE: -4.00
OS_SPHERE: -4.00
OD_AXIS: 180
OD_BASECURVE: 8.60
OD_DIAMETER: 14.5
OS_BASECURVE: 8.60
OS_DIAMETER: 14.5
OS_CYLINDER: -1.25

## 2022-04-21 ASSESSMENT — SLIT LAMP EXAM - LIDS
COMMENTS: NORMAL
COMMENTS: NORMAL

## 2022-04-21 ASSESSMENT — CUP TO DISC RATIO
OS_RATIO: 0.2
OD_RATIO: 0.2

## 2022-04-21 ASSESSMENT — TONOMETRY
OD_IOP_MMHG: 16
IOP_METHOD: APPLANATION
OS_IOP_MMHG: 16

## 2022-04-21 ASSESSMENT — EXTERNAL EXAM - LEFT EYE: OS_EXAM: NORMAL

## 2022-04-21 ASSESSMENT — EXTERNAL EXAM - RIGHT EYE: OD_EXAM: NORMAL

## 2022-04-21 NOTE — LETTER
4/21/2022         RE: Jian Oliveira  [address] [address]        Dear Colleague,    Thank you for referring your patient, Jian Oliveira, to the [facility] [place]. Please see a copy of my visit note below.    Chief Complaint   Patient presents with     Annual Eye Exam     Would like more contacts- $75 fit fee OK        Previous contact lens wearer? Yes: Acuvue Oasys with Astigmatism  Comfort of contact lenses :very good, no issues  Satisfied with current lenses: Yes        Last Eye Exam: 2021  Dilated Previously: Declined dilation today    What are you currently using to see?  glasses and contacts    Distance Vision Acuity: Noticed gradual change in both eyes    Near Vision Acuity: Satisfied with vision while reading and using computer with glasses and contacts    Eye Comfort: good  Do you use eye drops? 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       OBJECTIVE: See Ophthalmology exam    ASSESSMENT:    ICD-10-CM    1. Myopia, bilateral - Both Eyes  H52.13 EYE EXAM (SIMPLE-NONBILLABLE)     REFRACTION     CONTACT LENS FITTING,BILAT w/ signed waiver   2. Regular astigmatism of both eyes - Both Eyes  H52.223 EYE EXAM (SIMPLE-NONBILLABLE)     REFRACTION     CONTACT LENS FITTING,BILAT w/ signed waiver      PLAN:     Patient Instructions   Myopia is a result of long eyes. It is commonly referred to as near-sightedness. Seeing clearly in the distance is the main challenge.    Astigmatism results from curvature differential in the cornea and crystalline lens which can cause a distorted image, as light rays are prevented from meeting at a common focus.    Eyeglass prescription given.    The affects of the dilating drops last for 4- 6 hours.  You will be more sensitive to light and vision will be blurry up close.  Do not drive if you do not feel  comfortable.  Mydriatic sunglasses were given if needed.    Recommend annual eye exams.    Christina Almonte O.D.  [address] [address] [phone]                           Again, thank you for allowing me to participate in the care of your patient.        Sincerely,        Christina Amlonte OD

## 2022-04-21 NOTE — PATIENT INSTRUCTIONS
Myopia is a result of long eyes. It is commonly referred to as near-sightedness. Seeing clearly in the distance is the main challenge.    Astigmatism results from curvature differential in the cornea and crystalline lens which can cause a distorted image, as light rays are prevented from meeting at a common focus.    Eyeglass prescription given.    The affects of the dilating drops last for 4- 6 hours.  You will be more sensitive to light and vision will be blurry up close.  Do not drive if you do not feel comfortable.  Mydriatic sunglasses were given if needed.    Recommend annual eye exams.    Christina Almonte O.D.  [address] [address]    [phone]

## 2022-04-21 NOTE — PROGRESS NOTES
Chief Complaint   Patient presents with     Annual Eye Exam     Would like more contacts- $75 fit fee OK        Previous contact lens wearer? Yes: Acuvue Oasys with Astigmatism  Comfort of contact lenses :very good, no issues  Satisfied with current lenses: Yes        Last Eye Exam: 2021  Dilated Previously: Declined dilation today    What are you currently using to see?  glasses and contacts    Distance Vision Acuity: Noticed gradual change in both eyes    Near Vision Acuity: Satisfied with vision while reading and using computer with glasses and contacts    Eye Comfort: good  Do you use eye drops? 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       OBJECTIVE: See Ophthalmology exam    ASSESSMENT:    ICD-10-CM    1. Myopia, bilateral - Both Eyes  H52.13 EYE EXAM (SIMPLE-NONBILLABLE)     REFRACTION     CONTACT LENS FITTING,BILAT w/ signed waiver   2. Regular astigmatism of both eyes - Both Eyes  H52.223 EYE EXAM (SIMPLE-NONBILLABLE)     REFRACTION     CONTACT LENS FITTING,BILAT w/ signed waiver      PLAN:     Patient Instructions   Myopia is a result of long eyes. It is commonly referred to as near-sightedness. Seeing clearly in the distance is the main challenge.    Astigmatism results from curvature differential in the cornea and crystalline lens which can cause a distorted image, as light rays are prevented from meeting at a common focus.    Eyeglass prescription given.    The affects of the dilating drops last for 4- 6 hours.  You will be more sensitive to light and vision will be blurry up close.  Do not drive if you do not feel comfortable.  Mydriatic sunglasses were given if needed.    Recommend annual eye exams.    Christina Almonte O.D.  [address] [address]    [phone]

## 2022-04-26 ENCOUNTER — TELEPHONE (OUTPATIENT)
Dept: OPTOMETRY | Facility: CLINIC | Age: 32
End: 2022-04-26
Payer: COMMERCIAL

## 2022-04-26 NOTE — TELEPHONE ENCOUNTER
Current Contact Lens Rx (Trial Lens, Ordered)     Brand Base Curve Diameter Sphere Cylinder Axis   Right AV CARLOS EDUARDO  8.60 14.5 -4.00 -0.75 180   Left  AV CARLOS EDUARDO  8.60 14.5 -4.00 -1.25 170     Ordered trials for pickup. 4/26/22 DP

## 2022-10-15 ENCOUNTER — HEALTH MAINTENANCE LETTER (OUTPATIENT)
Age: 32
End: 2022-10-15

## 2022-12-20 ENCOUNTER — ANCILLARY PROCEDURE (OUTPATIENT)
Dept: GENERAL RADIOLOGY | Facility: CLINIC | Age: 32
End: 2022-12-20
Attending: PHYSICIAN ASSISTANT
Payer: COMMERCIAL

## 2022-12-20 ENCOUNTER — OFFICE VISIT (OUTPATIENT)
Dept: URGENT CARE | Facility: URGENT CARE | Age: 32
End: 2022-12-20
Payer: COMMERCIAL

## 2022-12-20 ENCOUNTER — OFFICE VISIT (OUTPATIENT)
Dept: PEDIATRICS | Facility: CLINIC | Age: 32
End: 2022-12-20
Attending: PHYSICIAN ASSISTANT
Payer: COMMERCIAL

## 2022-12-20 ENCOUNTER — ANCILLARY PROCEDURE (OUTPATIENT)
Dept: CT IMAGING | Facility: CLINIC | Age: 32
End: 2022-12-20
Attending: STUDENT IN AN ORGANIZED HEALTH CARE EDUCATION/TRAINING PROGRAM
Payer: COMMERCIAL

## 2022-12-20 VITALS
DIASTOLIC BLOOD PRESSURE: 86 MMHG | SYSTOLIC BLOOD PRESSURE: 153 MMHG | TEMPERATURE: 100.2 F | RESPIRATION RATE: 12 BRPM | OXYGEN SATURATION: 99 % | HEART RATE: 97 BPM

## 2022-12-20 VITALS
HEART RATE: 101 BPM | BODY MASS INDEX: 27.43 KG/M2 | WEIGHT: 196.7 LBS | TEMPERATURE: 99.2 F | OXYGEN SATURATION: 99 % | DIASTOLIC BLOOD PRESSURE: 85 MMHG | SYSTOLIC BLOOD PRESSURE: 159 MMHG

## 2022-12-20 DIAGNOSIS — R05.1 ACUTE COUGH: ICD-10-CM

## 2022-12-20 DIAGNOSIS — R22.1 NECK SWELLING: ICD-10-CM

## 2022-12-20 DIAGNOSIS — R68.81 EARLY SATIETY: ICD-10-CM

## 2022-12-20 DIAGNOSIS — R10.13 ABDOMINAL PAIN, EPIGASTRIC: ICD-10-CM

## 2022-12-20 DIAGNOSIS — R59.1 LYMPHADENOPATHY: ICD-10-CM

## 2022-12-20 DIAGNOSIS — R10.13 EPIGASTRIC PAIN: ICD-10-CM

## 2022-12-20 DIAGNOSIS — R50.9 FEVER, UNSPECIFIED FEVER CAUSE: ICD-10-CM

## 2022-12-20 DIAGNOSIS — R50.9 FEVER, UNSPECIFIED FEVER CAUSE: Primary | ICD-10-CM

## 2022-12-20 DIAGNOSIS — R59.1 LYMPHADENOPATHY: Primary | ICD-10-CM

## 2022-12-20 LAB
ALBUMIN UR-MCNC: ABNORMAL MG/DL
APPEARANCE UR: CLEAR
BACTERIA #/AREA URNS HPF: ABNORMAL /HPF
BILIRUB UR QL STRIP: NEGATIVE
COLOR UR AUTO: YELLOW
DEPRECATED S PYO AG THROAT QL EIA: NEGATIVE
ERYTHROCYTE [SEDIMENTATION RATE] IN BLOOD BY WESTERGREN METHOD: 8 MM/HR (ref 0–15)
FLUAV AG SPEC QL IA: NEGATIVE
FLUBV AG SPEC QL IA: NEGATIVE
GLUCOSE UR STRIP-MCNC: NEGATIVE MG/DL
GROUP A STREP BY PCR: NOT DETECTED
HGB UR QL STRIP: NEGATIVE
KETONES UR STRIP-MCNC: ABNORMAL MG/DL
LEUKOCYTE ESTERASE UR QL STRIP: NEGATIVE
MONOCYTES NFR BLD AUTO: NEGATIVE %
NITRATE UR QL: NEGATIVE
PH UR STRIP: 6 [PH] (ref 5–7)
RBC #/AREA URNS AUTO: ABNORMAL /HPF
SP GR UR STRIP: 1.02 (ref 1–1.03)
SQUAMOUS #/AREA URNS AUTO: ABNORMAL /LPF
UROBILINOGEN UR STRIP-ACNC: 1 E.U./DL
WBC #/AREA URNS AUTO: ABNORMAL /HPF

## 2022-12-20 PROCEDURE — 70491 CT SOFT TISSUE NECK W/DYE: CPT | Mod: GC | Performed by: RADIOLOGY

## 2022-12-20 PROCEDURE — 83690 ASSAY OF LIPASE: CPT | Performed by: PHYSICIAN ASSISTANT

## 2022-12-20 PROCEDURE — 85025 COMPLETE CBC W/AUTO DIFF WBC: CPT | Performed by: PHYSICIAN ASSISTANT

## 2022-12-20 PROCEDURE — 99207 REFERRAL TO ACUTE AND DIAGNOSTIC SERVICES: CPT | Performed by: PHYSICIAN ASSISTANT

## 2022-12-20 PROCEDURE — 81001 URINALYSIS AUTO W/SCOPE: CPT | Performed by: PHYSICIAN ASSISTANT

## 2022-12-20 PROCEDURE — 99417 PROLNG OP E/M EACH 15 MIN: CPT | Performed by: STUDENT IN AN ORGANIZED HEALTH CARE EDUCATION/TRAINING PROGRAM

## 2022-12-20 PROCEDURE — 71260 CT THORAX DX C+: CPT | Mod: GC | Performed by: RADIOLOGY

## 2022-12-20 PROCEDURE — 36415 COLL VENOUS BLD VENIPUNCTURE: CPT | Performed by: PHYSICIAN ASSISTANT

## 2022-12-20 PROCEDURE — 99215 OFFICE O/P EST HI 40 MIN: CPT | Performed by: STUDENT IN AN ORGANIZED HEALTH CARE EDUCATION/TRAINING PROGRAM

## 2022-12-20 PROCEDURE — 71046 X-RAY EXAM CHEST 2 VIEWS: CPT | Mod: TC | Performed by: RADIOLOGY

## 2022-12-20 PROCEDURE — 85652 RBC SED RATE AUTOMATED: CPT | Performed by: PHYSICIAN ASSISTANT

## 2022-12-20 PROCEDURE — 86308 HETEROPHILE ANTIBODY SCREEN: CPT

## 2022-12-20 PROCEDURE — 80053 COMPREHEN METABOLIC PANEL: CPT | Performed by: PHYSICIAN ASSISTANT

## 2022-12-20 PROCEDURE — 87804 INFLUENZA ASSAY W/OPTIC: CPT | Performed by: PHYSICIAN ASSISTANT

## 2022-12-20 PROCEDURE — 87086 URINE CULTURE/COLONY COUNT: CPT | Performed by: PHYSICIAN ASSISTANT

## 2022-12-20 PROCEDURE — 87651 STREP A DNA AMP PROBE: CPT | Performed by: PHYSICIAN ASSISTANT

## 2022-12-20 PROCEDURE — 74160 CT ABDOMEN W/CONTRAST: CPT | Mod: GC | Performed by: RADIOLOGY

## 2022-12-20 RX ORDER — IOPAMIDOL 755 MG/ML
120 INJECTION, SOLUTION INTRAVASCULAR ONCE
Status: COMPLETED | OUTPATIENT
Start: 2022-12-20 | End: 2022-12-20

## 2022-12-20 RX ORDER — AZITHROMYCIN 250 MG/1
TABLET, FILM COATED ORAL
Qty: 6 TABLET | Refills: 0 | Status: SHIPPED | OUTPATIENT
Start: 2022-12-20 | End: 2022-12-25

## 2022-12-20 RX ADMIN — IOPAMIDOL 120 ML: 755 INJECTION, SOLUTION INTRAVASCULAR at 17:34

## 2022-12-20 ASSESSMENT — PAIN SCALES - GENERAL: PAINLEVEL: NO PAIN (0)

## 2022-12-20 NOTE — PROGRESS NOTES
Chief Complaint   Patient presents with     Fever     Intermittent fever for 5-6 days. Fever of 101.5 Monday night, 103.8 Sunday night. Under control with ibuprofen and aspirin.      Cough     Headache     Fatigue     Pharyngitis     Swollen lymph nodes     Ear Problem     Inflammation on right side of face (behind right ear). Negative rapid covid test.        Results for orders placed or performed in visit on 12/20/22   XR Chest 2 Views     Status: None    Narrative    XR CHEST 2 VIEWS  12/20/2022 1:09 PM       INDICATION: Fever, abdominal pain and acute cough  COMPARISON: 2/28/2022       Impression    IMPRESSION: Negative chest.    ABIODUN RIVERA MD         SYSTEM ID:  [id number]   Results for orders placed or performed in visit on 12/20/22   ESR: Erythrocyte sedimentation rate     Status: Normal   Result Value Ref Range    Erythrocyte Sedimentation Rate 8 0 - 15 mm/hr   UA Macro with Reflex to Micro and Culture - lab collect     Status: Abnormal    Specimen: Urine, Midstream   Result Value Ref Range    Color Urine Yellow Colorless, Straw, Light Yellow, Yellow    Appearance Urine Clear Clear    Glucose Urine Negative Negative mg/dL    Bilirubin Urine Negative Negative    Ketones Urine Trace (A) Negative mg/dL    Specific Gravity Urine 1.020 1.003 - 1.035    Blood Urine Negative Negative    pH Urine 6.0 5.0 - 7.0    Protein Albumin Urine Trace (A) Negative mg/dL    Urobilinogen Urine 1.0 0.2, 1.0 E.U./dL    Nitrite Urine Negative Negative    Leukocyte Esterase Urine Negative Negative   Urine Microscopic     Status: Abnormal   Result Value Ref Range    Bacteria Urine Moderate (A) None Seen /HPF    RBC Urine None Seen 0-2 /HPF /HPF    WBC Urine 0-5 0-5 /HPF /HPF    Squamous Epithelials Urine Few (A) None Seen /LPF    Narrative    Urine Culture not indicated   Streptococcus A Rapid Screen w/Reflex to PCR - Clinic Collect     Status: Normal    Specimen: Throat; Swab   Result Value Ref Range    Group A Strep antigen  Negative Negative   Influenza A & B Antigen - Clinic Collect     Status: Normal    Specimen: Nose; Swab   Result Value Ref Range    Influenza A antigen Negative Negative    Influenza B antigen Negative Negative    Narrative    Test results must be correlated with clinical data. If necessary, results should be confirmed by a molecular assay or viral culture.   CBC with platelets and differential     Status: None (In process)    Narrative    The following orders were created for panel order CBC with platelets and differential.  Procedure                               Abnormality         Status                     ---------                               -----------         ------                     CBC with platelets and d...[[id number]]                      In process                   Please view results for these tests on the individual orders.                 ASSESSMENT:     ICD-10-CM    1. Fever, unspecified fever cause  R50.9 Streptococcus A Rapid Screen w/Reflex to PCR - Clinic Collect     Influenza A & B Antigen - Clinic Collect     CBC with platelets and differential     ESR: Erythrocyte sedimentation rate     Comprehensive metabolic panel (BMP + Alb, Alk Phos, ALT, AST, Total. Bili, TP)     Lipase     UA Macro with Reflex to Micro and Culture - lab collect     XR Chest 2 Views     CBC with platelets and differential     ESR: Erythrocyte sedimentation rate     Comprehensive metabolic panel (BMP + Alb, Alk Phos, ALT, AST, Total. Bili, TP)     Lipase     UA Macro with Reflex to Micro and Culture - lab collect     Group A Streptococcus PCR Throat Swab     Urine Microscopic     Urine Culture Aerobic Bacterial - lab collect     Urine Culture Aerobic Bacterial - lab collect      2. Abdominal pain, epigastric  R10.13 Streptococcus A Rapid Screen w/Reflex to PCR - Clinic Collect     Influenza A & B Antigen - Clinic Collect     CBC with platelets and differential     ESR: Erythrocyte sedimentation rate      Comprehensive metabolic panel (BMP + Alb, Alk Phos, ALT, AST, Total. Bili, TP)     Lipase     UA Macro with Reflex to Micro and Culture - lab collect     XR Chest 2 Views     CBC with platelets and differential     ESR: Erythrocyte sedimentation rate     Comprehensive metabolic panel (BMP + Alb, Alk Phos, ALT, AST, Total. Bili, TP)     Lipase     UA Macro with Reflex to Micro and Culture - lab collect     Group A Streptococcus PCR Throat Swab     Urine Microscopic     Urine Culture Aerobic Bacterial - lab collect     Urine Culture Aerobic Bacterial - lab collect      3. Early satiety  R68.81       4. Lymphadenopathy  R59.1       5. Acute cough  R05.1 Streptococcus A Rapid Screen w/Reflex to PCR - Clinic Collect     Influenza A & B Antigen - Clinic Collect     CBC with platelets and differential     ESR: Erythrocyte sedimentation rate     Comprehensive metabolic panel (BMP + Alb, Alk Phos, ALT, AST, Total. Bili, TP)     Lipase     UA Macro with Reflex to Micro and Culture - lab collect     XR Chest 2 Views     CBC with platelets and differential     ESR: Erythrocyte sedimentation rate     Comprehensive metabolic panel (BMP + Alb, Alk Phos, ALT, AST, Total. Bili, TP)     Lipase     UA Macro with Reflex to Micro and Culture - lab collect     Group A Streptococcus PCR Throat Swab     Urine Microscopic     Urine Culture Aerobic Bacterial - lab collect     Urine Culture Aerobic Bacterial - lab collect              PLAN: Unclear etiology.  At least 60 minutes is spent with the patient here today with chart review, lab review, imaging, history and physical.  Subtle right sided lymphadenopathy.  Epigastric tenderness with guarding on exam.  Fever and night sweats.  CBC today showed normal white count and hemoglobin.  However atypical lymphs.  They are spinning it to have it looked at underneath the microscope.  Referral to acute diagnostic services.  Differential includes but is not limited to viral syndrome, pancreatitis,  lymphoma, abdominal cancer, Pyelonephritis  I have discussed clinical findings with patient.  Symptomatic care is discussed.  All questions are answered, patient indicates understanding of these issues and is in agreement with plan.   Patient care instructions are discussed/given at the end of visit.       Amy Marie PA-C      SUBJECTIVE:  32-year-old male presents for 5 to 6 days of fever.  The worst was 2 nights ago, 103.8.  Does have a headache and cough.  Some right neck discomfort, possible swollen glands.  No constipation or diarrhea.  Decreased appetite.  Slight abdominal discomfort.  No back pain.  Slight runny nose.  Some nausea, no vomiting.  No sore throat or postnasal drip.  Negative home COVID test x2.  Occupation, snowplowing.  Past week has been feeling full even with eating a small amount. Some night sweats      Allergies   Allergen Reactions     Septra [Sulfamethoxazole W/Trimethoprim]      Sulfamethoxazole-Trimethoprim Unknown     Cefaclor Unknown and Rash     Sulfa Drugs Rash       Past Medical History:   Diagnosis Date     Hypospadias     repair around the time of birth.        No current outpatient medications on file prior to visit.  No current facility-administered medications on file prior to visit.      Social History     Tobacco Use     Smoking status: Never     Smokeless tobacco: Never   Substance Use Topics     Alcohol use: Never       ROS:  CONSTITUTIONAL: Negative for fatigue or fever.  EYES: Negative for eye problems.  ENT: As above.  RESP: As above.  CV: Negative for chest pains.  GI: Negative for vomiting.  MUSCULOSKELETAL:  Negative for significant muscle or joint pains.  NEUROLOGIC: Negative for headaches.  SKIN: Negative for rash.  PSYCH: Normal mentation for age.    OBJECTIVE:  BP (!) 159/85 (BP Location: Left arm, Patient Position: Sitting, Cuff Size: Adult Large)   Pulse 101   Temp 99.2  F (37.3  C) (Tympanic)   Wt 89.2 kg (196 lb 11.2 oz)   SpO2 99%   BMI 27.43  kg/m    GENERAL APPEARANCE: Healthy, alert and no distress.  EYES:Conjunctiva/sclera clear.  EARS: No cerumen.   Ear canals w/o erythema.  TM's intact w/o erythema.    NOSE/MOUTH: Nose without ulcers, erythema or lesions.  No mastoid tenderness.  SINUSES: No maxillary sinus tenderness.  THROAT: No erythema w/o tonsillar enlargement . No exudates.  NECK: Supple, nontender, mild right anterior cervical upper chain subtle slightly enlarged lymph node.  Right supraclavicular area along muscle edge I feel subtle possible enlarged lymph nodes, one the size of a pea and the other the size of a BB.   Full range of motion of neck without discomfort.  RESP: Lungs clear to auscultation - no rales, rhonchi or wheezes  CV: Regular rate and rhythm, normal S1 S2, no murmur noted.  NEURO: Awake, alert    SKIN: No rashes  Abdomen-normal active bowel sounds.  Some guarding and tenderness epigastric area.  This is the first time he is aware of localized pain.  No rigidity or rebound.      Amy Marie PA-C

## 2022-12-20 NOTE — PROGRESS NOTES
Assessment & Plan     Lymphadenopathy  Patient presents with 6 days of night sweats, fevers, highest temp of 103.6 and prominent right anterior cervical adenopathy and palpable right supraclavicular lymph node concerning for possible intrathoracic or intraabdominal malignancy or lymphoma particularly in the setting of recent night sweats and fevers. I ordered a CT of neck/chest/abdomen/pelvis to evaluate for intrathoracic or intraabdominal lymphadenopathy or mass. Neck CT shows prominent lymph nodes bilaterally most likely reactive in nature. The C/A/P CT shows prominent lymph nodes in the right axilla and RLQ mesentery per preliminary report by resident. I paged the radiologist but the resident answered so we are still waiting on a final read on this. Given the widespread lymphadenopathy and fevers so far thought to be reactive, I am treating him with azithromycin empirically. When I did the physical exam, I thought his breath smelled reminiscent of strep but he had a strep test today that was negative. Just before leaving the [facility] he mentioned that the MA said she wasn't sure she got a good swab for strep which leads me to wonder if the sample was inadequate. He has allergies to penicillin and cephalosporins and azithromycin will cover for strep if this were part of the underlying cause for symptoms. I will call him once the C/A/P CT final report is back to discuss results.   - Mononucleosis screen  - CT Chest Abdomen w Contrast; Future  - CT Chest Abdomen w Contrast  - CT Soft Tissue Neck w Contrast; Future  - CT Soft Tissue Neck w Contrast  - azithromycin (ZITHROMAX) 250 MG tablet; Take 2 tablets (500 mg) by mouth daily for 1 day, THEN 1 tablet (250 mg) daily for 4 days.    Fever, unspecified fever cause  See above notes  - IV access  - sodium chloride (PF) 0.9% PF flush 3 mL  - Mononucleosis screen  - CT Chest Abdomen w Contrast; Future  - CT Chest Abdomen w Contrast  - CT Soft Tissue Neck w Contrast;  Future  - CT Soft Tissue Neck w Contrast  - azithromycin (ZITHROMAX) 250 MG tablet; Take 2 tablets (500 mg) by mouth daily for 1 day, THEN 1 tablet (250 mg) daily for 4 days.    Epigastric pain  Pain with palpation of the epigastric region on exam. He has also reported early satiety recently which also raised my concern for possible intraabdominal lymphadenopathy and ruling out a GI lymphoma. Still awaiting C/A/P results to further evaluate for this.   - CT Chest Abdomen w Contrast; Future  - CT Chest Abdomen w Contrast  - CT Soft Tissue Neck w Contrast; Future  - CT Soft Tissue Neck w Contrast    Neck swelling  - CT Soft Tissue Neck w Contrast; Future  - CT Soft Tissue Neck w Contrast      80 minutes spent on the date of the encounter doing chart review, review of test results, patient visit, documentation and discussion with family            No follow-ups on file.    ELVIN Baez [facility]   Jian Motley is a 32 year old accompanied by his spouse, presenting for the following health issues:  Fever, cough, headache    HPI       Acute Illness  Acute illness concerns: fever  Onset/Duration: Wednesday 12/14/22  Symptoms:  Fever: YES  Chills/Sweats: YES  Headache (location?): YES  Sinus Pressure: No  Conjunctivitis:  NO  Ear Pain: YES: right  Rhinorrhea: YES- a little  Congestion: YES  Sore Throat: YES  Cough: YES  Wheeze: No but does state has a little shortness of breath  Decreased Appetite: YES  Nausea: YES  Vomiting: No  Diarrhea: No  Dysuria/Freq.: No  Dysuria or Hematuria: No  Fatigue/Achiness: YES  Sick/Strep Exposure: No- denies exposure  Therapies tried and outcome: Asprin, ibuprofen  Progression of Symptoms:  improving  Previous history of similar problem: Earlier this year was similar, ongoing low grade fever, some night sweats and did chest CT because of cough and it was unremarkable. He was prescribed azithromycin and in one week the fevers  resolved. 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 Will spend 12-15 hours at a time sometimes plowing. He has had no recent unexplained weight loss. Has been sick off and on with viral symptoms for the past 4 months which he attributed to having a son in . This time, however, his son has not been sick so he was concerned it may be due to something else.   Precipitating factors:        Worsened by: none  Alleviating factors:        Improved by: Aspirin helped  Therapies tried and outcome: aspirin, ibuprofen  Night sweats 3-4 times in the past couple weeks. Swelling in the right neck.       Review of Systems   Constitutional, HEENT, cardiovascular, pulmonary, GI, , musculoskeletal, neuro, skin, endocrine and psych systems are negative, except as otherwise noted.      Objective    BP (!) 153/86 (BP Location: Right arm, Patient Position: Sitting, Cuff Size: Adult Regular)   Pulse 97   Temp 100.2  F (37.9  C) (Oral)   Resp 12   SpO2 99%   There is no height or weight on file to calculate BMI.  Physical Exam   GENERAL: healthy, alert and no distress  EYES: Eyes grossly normal to inspection, PERRL and conjunctivae and sclerae normal  HENT: ear canals and TM's normal, nose and mouth without ulcers or lesions  NECK: bilateral anterior cervical adenopathy, right > left with prominence of area surrounding SCM muscle on the right, one prominent lymph node right supraclavicular, no asymmetry, masses, or scars and thyroid normal to palpation  RESP: lungs clear to auscultation - no rales, rhonchi or wheezes  CV: regular rate and rhythm, normal S1 S2, no S3 or S4, no murmur, click or rub, no peripheral edema  ABDOMEN: soft, tenderness to palpation of epigastric region, no hepatosplenomegaly, no masses and bowel sounds normal  MS: no gross musculoskeletal defects noted, no edema  SKIN: no suspicious lesions or rashes  NEURO: Normal strength and tone,  mentation intact and speech normal  PSYCH: mentation appears normal, affect normal/bright  LYMPH: no prominent right axillary lymphadenopathy    Results for orders placed or performed in visit on 12/20/22 (from the past 24 hour(s))   Mononucleosis screen   Result Value Ref Range    Mononucleosis Screen Negative Negative   CT Soft Tissue Neck w Contrast    Narrative    EXAM: CT SOFT TISSUE NECK W CONTRAST  12/20/2022 5:34 PM     HISTORY:  enlarged right supraclavicular lymph node along with  mild-mod swelling of the right neck extending to posterior right ear;  Fever, unspecified fever cause; Epigastric pain; Lymphadenopathy; Neck  swelling         COMPARISON:  CT chest abdomen and pelvis today.     TECHNIQUE: Following intravenous administration of nonionic iodinated  contrast medium, thin section helical CT images were obtained from the  skull base down to the level of the aortic arch.  Axial, coronal and  sagittal reformations were performed with 2-3 mm slice thickness  reconstruction. Images were reviewed in soft tissue, lung and bone  windows.    CONTRAST: 120ml isovue 370    FINDINGS:   Numerous prominent and mildly enlarged lymph nodes bilaterally  throughout the IIa, IIb, IVb, Va, Vb, and submandibular regions. No  focal mass or region of abnormal contrast enhancement.    Patent bilateral fossae of Rosenmuller. No nasopharyngeal,  oropharyngeal, hypopharyngeal, or glottic mucosal space abnormality.  Normal tongue base. Salivary glands are within normal limits.    Normal thyroid gland.    Patent cervical vasculature; no high grade arterial stenosis.    No suspicious osseus lesion. No high grade spinal canal stenosis.    Mucosal retention cyst within the left maxillary sinus and trace  bimaxillary mucosal thickening. Mastoid air cells are clear. No  periapical dental lucency. The imaged skull base, intracranial and  orbital structures are within normal limits.    No suspicious finding in the visualized superior  mediastinum/thorax.  Clear lung apices.      Impression    IMPRESSION:   1. Numerous prominent and mildly enlarged bilateral cervical lymph  nodes, nonspecific but likely reactive.  2. No soft tissue neck mass.    I have personally reviewed the examination and initial interpretation  and I agree with the findings.    BRAXTON LINCOLN MD         SYSTEM ID:  [id number]   CT Chest Abdomen w Contrast    Impression    RESIDENT PRELIMINARY INTERPRETATION  IMPRESSION:   Prominent lymph nodes in the right axilla and right lower quadrant  mesentery. No other acute or suspicious findings in the chest abdomen  or pelvis.        Results for orders placed or performed in visit on 12/20/22   CT Chest Abdomen w Contrast    Impression    RESIDENT PRELIMINARY INTERPRETATION  IMPRESSION:   Prominent lymph nodes in the right axilla and right lower quadrant  mesentery. No other acute or suspicious findings in the chest abdomen  or pelvis.      CT Soft Tissue Neck w Contrast    Impression    IMPRESSION:   1. Numerous prominent and mildly enlarged bilateral cervical lymph  nodes, nonspecific but likely reactive.  2. No soft tissue neck mass.    I have personally reviewed the examination and initial interpretation  and I agree with the findings.    BRAXTON LINCOLN MD         SYSTEM ID:  [id number]

## 2022-12-21 LAB
ALBUMIN SERPL-MCNC: 4.2 G/DL (ref 3.4–5)
ALP SERPL-CCNC: 74 U/L (ref 40–150)
ALT SERPL W P-5'-P-CCNC: 60 U/L (ref 0–70)
ANION GAP SERPL CALCULATED.3IONS-SCNC: 6 MMOL/L (ref 3–14)
AST SERPL W P-5'-P-CCNC: 34 U/L (ref 0–45)
BASOPHILS # BLD AUTO: 0.1 10E3/UL (ref 0–0.2)
BASOPHILS NFR BLD AUTO: 1 %
BILIRUB SERPL-MCNC: 0.9 MG/DL (ref 0.2–1.3)
BUN SERPL-MCNC: 16 MG/DL (ref 7–30)
CALCIUM SERPL-MCNC: 8.6 MG/DL (ref 8.5–10.1)
CHLORIDE BLD-SCNC: 100 MMOL/L (ref 94–109)
CO2 SERPL-SCNC: 30 MMOL/L (ref 20–32)
CREAT SERPL-MCNC: 0.99 MG/DL (ref 0.66–1.25)
EOSINOPHIL # BLD AUTO: 0 10E3/UL (ref 0–0.7)
EOSINOPHIL NFR BLD AUTO: 1 %
ERYTHROCYTE [DISTWIDTH] IN BLOOD BY AUTOMATED COUNT: 11.9 % (ref 10–15)
GFR SERPL CREATININE-BSD FRML MDRD: >90 ML/MIN/1.73M2
GLUCOSE BLD-MCNC: 105 MG/DL (ref 70–99)
HCT VFR BLD AUTO: 42.4 % (ref 40–53)
HGB BLD-MCNC: 14.9 G/DL (ref 13.3–17.7)
IMM GRANULOCYTES # BLD: 0 10E3/UL
IMM GRANULOCYTES NFR BLD: 1 %
LIPASE SERPL-CCNC: 129 U/L (ref 73–393)
LYMPHOCYTES # BLD AUTO: 2.8 10E3/UL (ref 0.8–5.3)
LYMPHOCYTES NFR BLD AUTO: 46 %
MCH RBC QN AUTO: 29.9 PG (ref 26.5–33)
MCHC RBC AUTO-ENTMCNC: 35.1 G/DL (ref 31.5–36.5)
MCV RBC AUTO: 85 FL (ref 78–100)
MONOCYTES # BLD AUTO: 0.5 10E3/UL (ref 0–1.3)
MONOCYTES NFR BLD AUTO: 9 %
NEUTROPHILS # BLD AUTO: 2.5 10E3/UL (ref 1.6–8.3)
NEUTROPHILS NFR BLD AUTO: 42 %
NRBC # BLD AUTO: 0 10E3/UL
NRBC BLD AUTO-RTO: 0 /100
PLAT MORPH BLD: ABNORMAL
PLATELET # BLD AUTO: 184 10E3/UL (ref 150–450)
POTASSIUM BLD-SCNC: 3.8 MMOL/L (ref 3.4–5.3)
PROT SERPL-MCNC: 7.4 G/DL (ref 6.8–8.8)
RBC # BLD AUTO: 4.98 10E6/UL (ref 4.4–5.9)
RBC MORPH BLD: ABNORMAL
SODIUM SERPL-SCNC: 136 MMOL/L (ref 133–144)
VARIANT LYMPHS BLD QL SMEAR: PRESENT
WBC # BLD AUTO: 6 10E3/UL (ref 4–11)

## 2022-12-22 ENCOUNTER — TELEPHONE (OUTPATIENT)
Dept: PEDIATRICS | Facility: CLINIC | Age: 32
End: 2022-12-22

## 2022-12-22 PROBLEM — K75.81 STEATOHEPATITIS, NON-ALCOHOLIC: Status: ACTIVE | Noted: 2022-09-08

## 2022-12-22 LAB — BACTERIA UR CULT: NORMAL

## 2022-12-23 NOTE — TELEPHONE ENCOUNTER
Patient called to report that he had fevers yesterday and last night, temp up to 101. He also had night sweats last night, one episode of diarrhea, intermittent coughing fits and continues to feel full quicker than usual. The swelling in the right neck and lymph nodes is improving and today he has had no fevers but typically fevers start in the evening and persist through the night. He has taken 2 doses of azithromycin and will take the 3rd dose tonight. He is concerned if the fevers and night sweats persist whether he needs further evaluation. Given he had a similar episode of this earlier this year and that the cervical lymph nodes are improving, I suspect he will continue to see gradual improvement in his symptoms as he did with prior episode. His lymphocyte morphology came back with reactive lymphocytes suggesting viral or infectious cause though other causes such as immune-mediated condition are possible. He does have a stressful job and admits to being hyperattuned to changes in his body. He is unsure if this is contributing to symptoms but it would not cause fevers and night sweats so if they persist, I think it is reasonable to have him seen in [facility] again to get a second opinion/further evaluation of his symptoms.   Dilcia Shields, CNP

## 2023-02-20 ENCOUNTER — OFFICE VISIT (OUTPATIENT)
Dept: URGENT CARE | Facility: URGENT CARE | Age: 33
End: 2023-02-20
Payer: COMMERCIAL

## 2023-02-20 VITALS
OXYGEN SATURATION: 97 % | BODY MASS INDEX: 27.8 KG/M2 | DIASTOLIC BLOOD PRESSURE: 96 MMHG | HEART RATE: 102 BPM | TEMPERATURE: 98.4 F | SYSTOLIC BLOOD PRESSURE: 144 MMHG | WEIGHT: 199.3 LBS

## 2023-02-20 DIAGNOSIS — J02.9 ACUTE PHARYNGITIS, UNSPECIFIED ETIOLOGY: ICD-10-CM

## 2023-02-20 DIAGNOSIS — J02.0 ACUTE STREPTOCOCCAL PHARYNGITIS: Primary | ICD-10-CM

## 2023-02-20 LAB — DEPRECATED S PYO AG THROAT QL EIA: POSITIVE

## 2023-02-20 PROCEDURE — 99213 OFFICE O/P EST LOW 20 MIN: CPT | Performed by: PHYSICIAN ASSISTANT

## 2023-02-20 PROCEDURE — 87880 STREP A ASSAY W/OPTIC: CPT | Performed by: PHYSICIAN ASSISTANT

## 2023-02-20 RX ORDER — PENICILLIN V POTASSIUM 500 MG/1
500 TABLET, FILM COATED ORAL 2 TIMES DAILY
Qty: 20 TABLET | Refills: 0 | Status: SHIPPED | OUTPATIENT
Start: 2023-02-20 | End: 2023-03-02

## 2023-02-21 NOTE — PROGRESS NOTES
Assessment & Plan     Acute streptococcal pharyngitis  - penicillin V (VEETID) 500 MG tablet; Take 1 tablet (500 mg) by mouth 2 times daily for 10 days    Acute pharyngitis, unspecified etiology  - Streptococcus A Rapid Screen w/Reflex to PCR - Clinic Collect    Strep positive. Will treat with penicillin twice daily for 10 days. Unknown mild reaction to ceclor as a baby.     Return in about 1 week (around 2/27/2023) for visit with primary care provider if not improving.     Hedy Washburn PA-C  [facility]    Subjective   Jian Oliveira is a 32 year old who presents for the following health issues     Patient presents with:  Pharyngitis: Mild sore throat, sx yesterday woke up with sore throat had to go get a popsicle to relive some of the pain, low grade temp, glands are swollen and tender, mild cough, coughing up some mucus. Some pressure in left ear    HPI    Jian presents clinic today for evaluation of a sore throat.  Symptoms first began yesterday when he woke up.  He had chills and a temperature of 99.7F.  His glands feel tender and swollen.  He does feel some pressure in his left ear as well.  Today, symptoms feel minimally improved.  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  He wanted to get in to be seen before the work rush.    Review of Systems   ROS negative except as stated above.      Objective    BP (!) 144/96 (BP Location: Left arm, Patient Position: Sitting, Cuff Size: Adult Large)   Pulse 102   Temp 98.4  F (36.9  C) (Tympanic)   Wt 90.4 kg (199 lb 4.8 oz)   SpO2 97%   BMI 27.80 kg/m    Physical Exam   GENERAL: healthy, alert and no distress  EYES: Eyes grossly normal to inspection, PERRL and conjunctivae and sclerae normal  HENT: ear canals and TM's normal, nose and mouth without ulcers or lesions  NECK: bilateral anterior cervical adenopathy, right>left, no masses, or scars and thyroid normal to palpation  RESP:  lungs clear to auscultation - no rales, rhonchi or wheezes  CV: regular rate and rhythm, normal S1 S2, no S3 or S4, no murmur, click or rub, no peripheral edema and peripheral pulses strong    Results for orders placed or performed in visit on 02/20/23   Streptococcus A Rapid Screen w/Reflex to PCR - Clinic Collect     Status: Abnormal    Specimen: Throat; Swab   Result Value Ref Range    Group A Strep antigen Positive (A) Negative

## 2023-06-01 ENCOUNTER — HEALTH MAINTENANCE LETTER (OUTPATIENT)
Age: 33
End: 2023-06-01

## 2023-08-30 ENCOUNTER — OFFICE VISIT (OUTPATIENT)
Dept: URGENT CARE | Facility: URGENT CARE | Age: 33
End: 2023-08-30
Payer: COMMERCIAL

## 2023-08-30 VITALS
RESPIRATION RATE: 16 BRPM | OXYGEN SATURATION: 100 % | SYSTOLIC BLOOD PRESSURE: 151 MMHG | WEIGHT: 199.2 LBS | TEMPERATURE: 97.1 F | DIASTOLIC BLOOD PRESSURE: 89 MMHG | BODY MASS INDEX: 27.78 KG/M2 | HEART RATE: 74 BPM

## 2023-08-30 DIAGNOSIS — R03.0 ELEVATED BP WITHOUT DIAGNOSIS OF HYPERTENSION: Primary | ICD-10-CM

## 2023-08-30 DIAGNOSIS — R51.9 ACUTE NONINTRACTABLE HEADACHE, UNSPECIFIED HEADACHE TYPE: ICD-10-CM

## 2023-08-30 PROCEDURE — 99212 OFFICE O/P EST SF 10 MIN: CPT | Performed by: STUDENT IN AN ORGANIZED HEALTH CARE EDUCATION/TRAINING PROGRAM

## 2023-08-30 ASSESSMENT — PAIN SCALES - GENERAL: PAINLEVEL: NO PAIN (1)

## 2023-08-30 NOTE — PROGRESS NOTES
"ASSESSMENT & PLAN:   Diagnoses and all orders for this visit:  Elevated BP without diagnosis of hypertension  Acute nonintractable headache, unspecified headache type      Right temple pressure x 1 week.   Unclear etiology.  Overall normal exam with no focal neurologic deficits and no lesions that would explain symptom.   Vitals stable. Elevated BP in clinic at 151/89, similar to past readings. No red flag symptoms. Advised to check BP at home and follow-up with PCP if persistently high.  ER precautions discussed including severe headache, vision changes, facial or extremity weakness.     No follow-ups on file.    There are no Patient Instructions on file for this visit.    At the end of the encounter, I discussed results, diagnosis, medications. Discussed red flags for immediate return to clinic/ER, as well as indications for follow up if no improvement. Patient and/or caregiver understood and agreed to plan. Patient was stable for discharge.    ------------------------------------------------------------------------  SUBJECTIVE  Patient presents with:  Headache: Patient has \"pressure\" in right temple for one week. Patient reports \"momentary\" dizziness for past week as well. Patient took one Skyler's aspirin five days ago.  Dizziness    HPI  Jian Tolu Oliveira is a(n) 33 year old male presenting to urgent care for \"pressure\" on right temple x1 week. Pressure is not there in the morning, but becomes consistent throughout the day. Does not feel painful or burning. No rash. Over the last week has also had momentary dizziness that last for a few seconds. Endorses minor muscle tension of the eyebrows. No facial numbness or weakness. Also reports an episode of right ear pressure that occurred once but has not returned since. Able to workout without symptom worsening. No chest pain, shortness of breath, nausea, vomiting, photophobia, vision changes    Review of Systems    No current outpatient medications on file. "     Problem List:  2022-09: Steatohepatitis, non-alcoholic  2017-08: Family history of early CAD    Allergies   Allergen Reactions    Cefaclor Unknown and Rash    Sulfa Antibiotics Rash         OBJECTIVE  Vitals:    08/30/23 1321   BP: (!) 151/89   BP Location: Left arm   Patient Position: Sitting   Cuff Size: Adult Regular   Pulse: 74   Resp: 16   Temp: 97.1  F (36.2  C)   TempSrc: Tympanic   SpO2: 100%   Weight: 90.4 kg (199 lb 3.2 oz)     Physical Exam   GENERAL: healthy, alert, no acute distress.   PSYCH: mentation appears normal. Normal affect  HEAD: normocephalic, atraumatic. With attention to right scalp and temple, no rash, erythema, warmth. Slight tenderness with palpation of right temple.  EYE: PERRL. EOMs intact. No scleral injection bilaterally.   EAR: external ear normal. Bilateral ear canals normal and nonpainful. Bilateral TM intact, pearly, translucent without bulging.  NOSE: external nose atraumatic without lesions.  OROPHARYNX: moist mucous membranes.   LUNGS: no increased work of breathing. Clear lung sounds bilaterally. No wheezing, rhonchi, or rales.   CV: regular rate and rhythm. No clicks, murmurs, or rubs.  NEURO: No dysarthria. PERRL. EOMs intact. Cranial nerves intact and symmetric. Moves all extremities equally. Normal gait.     No results found for any visits on 08/30/23.

## 2024-08-04 ENCOUNTER — HEALTH MAINTENANCE LETTER (OUTPATIENT)
Age: 34
End: 2024-08-04

## 2025-08-16 ENCOUNTER — HEALTH MAINTENANCE LETTER (OUTPATIENT)
Age: 35
End: 2025-08-16